# Patient Record
Sex: MALE | Race: WHITE | NOT HISPANIC OR LATINO | URBAN - METROPOLITAN AREA
[De-identification: names, ages, dates, MRNs, and addresses within clinical notes are randomized per-mention and may not be internally consistent; named-entity substitution may affect disease eponyms.]

---

## 2017-02-26 ENCOUNTER — EMERGENCY (EMERGENCY)
Facility: HOSPITAL | Age: 62
LOS: 0 days | Discharge: HOME | End: 2017-02-26
Attending: EMERGENCY MEDICINE | Admitting: EMERGENCY MEDICINE
Payer: COMMERCIAL

## 2017-02-26 VITALS
SYSTOLIC BLOOD PRESSURE: 152 MMHG | HEART RATE: 88 BPM | DIASTOLIC BLOOD PRESSURE: 78 MMHG | OXYGEN SATURATION: 95 % | TEMPERATURE: 98 F | WEIGHT: 250 LBS | RESPIRATION RATE: 18 BRPM | HEIGHT: 72 IN

## 2017-02-26 VITALS
SYSTOLIC BLOOD PRESSURE: 146 MMHG | RESPIRATION RATE: 18 BRPM | DIASTOLIC BLOOD PRESSURE: 83 MMHG | HEART RATE: 87 BPM | OXYGEN SATURATION: 100 % | TEMPERATURE: 99 F

## 2017-02-26 DIAGNOSIS — R14.0 ABDOMINAL DISTENSION (GASEOUS): ICD-10-CM

## 2017-02-26 DIAGNOSIS — Z98.890 OTHER SPECIFIED POSTPROCEDURAL STATES: Chronic | ICD-10-CM

## 2017-02-26 DIAGNOSIS — K56.7 ILEUS, UNSPECIFIED: ICD-10-CM

## 2017-02-26 DIAGNOSIS — Z88.5 ALLERGY STATUS TO NARCOTIC AGENT: ICD-10-CM

## 2017-02-26 DIAGNOSIS — I10 ESSENTIAL (PRIMARY) HYPERTENSION: ICD-10-CM

## 2017-02-26 LAB
ALBUMIN SERPL ELPH-MCNC: 2.7 G/DL — LOW (ref 3.3–5)
ALP SERPL-CCNC: 118 U/L — SIGNIFICANT CHANGE UP (ref 40–120)
ALT FLD-CCNC: 34 U/L — SIGNIFICANT CHANGE UP (ref 12–78)
ANION GAP SERPL CALC-SCNC: 10 MMOL/L — SIGNIFICANT CHANGE UP (ref 5–17)
APTT BLD: 35 SEC — SIGNIFICANT CHANGE UP (ref 27.5–37.4)
AST SERPL-CCNC: 38 U/L — HIGH (ref 15–37)
BASOPHILS # BLD AUTO: 0.1 K/UL — SIGNIFICANT CHANGE UP (ref 0–0.2)
BASOPHILS NFR BLD AUTO: 1.1 % — SIGNIFICANT CHANGE UP (ref 0–2)
BILIRUB SERPL-MCNC: 0.4 MG/DL — SIGNIFICANT CHANGE UP (ref 0.2–1.2)
BUN SERPL-MCNC: 7 MG/DL — SIGNIFICANT CHANGE UP (ref 7–23)
CALCIUM SERPL-MCNC: 8.1 MG/DL — LOW (ref 8.5–10.1)
CHLORIDE SERPL-SCNC: 107 MMOL/L — SIGNIFICANT CHANGE UP (ref 96–108)
CO2 SERPL-SCNC: 28 MMOL/L — SIGNIFICANT CHANGE UP (ref 22–31)
CREAT SERPL-MCNC: 0.59 MG/DL — SIGNIFICANT CHANGE UP (ref 0.5–1.3)
EOSINOPHIL # BLD AUTO: 0.5 K/UL — SIGNIFICANT CHANGE UP (ref 0–0.5)
EOSINOPHIL NFR BLD AUTO: 4.9 % — SIGNIFICANT CHANGE UP (ref 0–6)
GLUCOSE SERPL-MCNC: 90 MG/DL — SIGNIFICANT CHANGE UP (ref 70–99)
HCT VFR BLD CALC: 33.4 % — LOW (ref 39–50)
HGB BLD-MCNC: 11.4 G/DL — LOW (ref 13–17)
LACTATE SERPL-SCNC: 0.9 MMOL/L — SIGNIFICANT CHANGE UP (ref 0.7–2)
LIDOCAIN IGE QN: 212 U/L — SIGNIFICANT CHANGE UP (ref 73–393)
LYMPHOCYTES # BLD AUTO: 1.4 K/UL — SIGNIFICANT CHANGE UP (ref 1–3.3)
LYMPHOCYTES # BLD AUTO: 13.8 % — SIGNIFICANT CHANGE UP (ref 13–44)
MCHC RBC-ENTMCNC: 30.8 PG — SIGNIFICANT CHANGE UP (ref 27–34)
MCHC RBC-ENTMCNC: 34.2 GM/DL — SIGNIFICANT CHANGE UP (ref 32–36)
MCV RBC AUTO: 90.2 FL — SIGNIFICANT CHANGE UP (ref 80–100)
MONOCYTES # BLD AUTO: 1.1 K/UL — HIGH (ref 0–0.9)
MONOCYTES NFR BLD AUTO: 10.5 % — SIGNIFICANT CHANGE UP (ref 2–14)
NEUTROPHILS # BLD AUTO: 7.2 K/UL — SIGNIFICANT CHANGE UP (ref 1.8–7.4)
NEUTROPHILS NFR BLD AUTO: 69.6 % — SIGNIFICANT CHANGE UP (ref 43–77)
PLATELET # BLD AUTO: 486 K/UL — HIGH (ref 150–400)
POTASSIUM SERPL-MCNC: 3.6 MMOL/L — SIGNIFICANT CHANGE UP (ref 3.5–5.3)
POTASSIUM SERPL-SCNC: 3.6 MMOL/L — SIGNIFICANT CHANGE UP (ref 3.5–5.3)
PROT SERPL-MCNC: 6.5 GM/DL — SIGNIFICANT CHANGE UP (ref 6–8.3)
RBC # BLD: 3.7 M/UL — LOW (ref 4.2–5.8)
RBC # FLD: 12.8 % — SIGNIFICANT CHANGE UP (ref 10.3–14.5)
SODIUM SERPL-SCNC: 145 MMOL/L — SIGNIFICANT CHANGE UP (ref 135–145)
WBC # BLD: 10.3 K/UL — SIGNIFICANT CHANGE UP (ref 3.8–10.5)
WBC # FLD AUTO: 10.3 K/UL — SIGNIFICANT CHANGE UP (ref 3.8–10.5)

## 2017-02-26 PROCEDURE — 71010: CPT | Mod: 26

## 2017-02-26 PROCEDURE — 99285 EMERGENCY DEPT VISIT HI MDM: CPT

## 2017-02-26 PROCEDURE — 74177 CT ABD & PELVIS W/CONTRAST: CPT | Mod: 26

## 2017-02-26 RX ORDER — SODIUM CHLORIDE 9 MG/ML
3 INJECTION INTRAMUSCULAR; INTRAVENOUS; SUBCUTANEOUS ONCE
Qty: 0 | Refills: 0 | Status: COMPLETED | OUTPATIENT
Start: 2017-02-26 | End: 2017-02-26

## 2017-02-26 RX ORDER — ONDANSETRON 8 MG/1
4 TABLET, FILM COATED ORAL ONCE
Qty: 0 | Refills: 0 | Status: COMPLETED | OUTPATIENT
Start: 2017-02-26 | End: 2017-02-26

## 2017-02-26 RX ORDER — SODIUM CHLORIDE 9 MG/ML
1000 INJECTION INTRAMUSCULAR; INTRAVENOUS; SUBCUTANEOUS
Qty: 0 | Refills: 0 | Status: DISCONTINUED | OUTPATIENT
Start: 2017-02-26 | End: 2017-02-26

## 2017-02-26 RX ADMIN — SODIUM CHLORIDE 125 MILLILITER(S): 9 INJECTION INTRAMUSCULAR; INTRAVENOUS; SUBCUTANEOUS at 10:20

## 2017-02-26 RX ADMIN — ONDANSETRON 4 MILLIGRAM(S): 8 TABLET, FILM COATED ORAL at 10:24

## 2017-02-26 RX ADMIN — SODIUM CHLORIDE 3 MILLILITER(S): 9 INJECTION INTRAMUSCULAR; INTRAVENOUS; SUBCUTANEOUS at 10:09

## 2017-02-26 NOTE — ED PROVIDER NOTE - OBJECTIVE STATEMENT
60 y/o M with PMHx of HTN and PSHx of lumbar fusion (on 2/17 at [place]) was BIBA from [facility] for r/o SBO. Pt stated his abd has been distended but without pain. No other complaints. No N/V/D, fever, SB, CP, SOB, abd pain, chills, cough.

## 2017-02-26 NOTE — ED ADULT NURSE NOTE - OBJECTIVE STATEMENT
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 Pt has large amount of distention noted to the abdomen. pt is on a clear liquid diet and tolerating it well at [facility], pt denies abdominal pain, denies nausea or vomitting. pt states he has had diarrhea. pt is alert and orientedx4, VSS afebreile. All needs anticipated and met, safety maintained, will continue to monitor

## 2017-02-26 NOTE — ED PROVIDER NOTE - NS ED MD SCRIBE ATTENDING SCRIBE SECTIONS
PHYSICAL EXAM/PAST MEDICAL/SURGICAL/SOCIAL HISTORY/REVIEW OF SYSTEMS/DISPOSITION/HISTORY OF PRESENT ILLNESS

## 2017-02-26 NOTE — ED ADULT NURSE NOTE - CHPI ED SYMPTOMS NEG
no fever/no hematuria/no dysuria/no vomiting/no nausea/no chills/no blood in stool/no burning urination

## 2017-02-26 NOTE — ED PROVIDER NOTE - PROGRESS NOTE DETAILS
the pt expresess wishes to be d/c'd with [facility], he has been asymptomic since ed arrival, symotims of am diplopia warrants neuro/optho eval, which can be done as an o/p as he is asymtpic now and does not want ed evlaution the pt is tolrating cld, + flatus, last BM was yest evening, is not experiencing abd pain or discomfort, offered admison for ilues/bowel rest, he declined and wishes to cont with clear liquid diet and bowel rest at rehab facility, as he is not obstructed/obstipated/in pain and tolerating po, I wo;; d/c at his request

## 2017-02-26 NOTE — ED PROVIDER NOTE - PSYCHIATRIC, MLM
Abdomen soft, non-tender, no guarding. Alert and oriented to person, place, time/situation. normal mood and affect. no apparent risk to self or others.

## 2017-10-30 PROBLEM — Z00.00 ENCOUNTER FOR PREVENTIVE HEALTH EXAMINATION: Status: ACTIVE | Noted: 2017-10-30

## 2020-01-17 PROBLEM — I10 ESSENTIAL (PRIMARY) HYPERTENSION: Chronic | Status: ACTIVE | Noted: 2017-02-26

## 2020-01-21 ENCOUNTER — APPOINTMENT (OUTPATIENT)
Dept: NEUROLOGY | Facility: CLINIC | Age: 65
End: 2020-01-21
Payer: COMMERCIAL

## 2020-01-21 ENCOUNTER — APPOINTMENT (OUTPATIENT)
Dept: NEUROLOGY | Facility: CLINIC | Age: 65
End: 2020-01-21

## 2020-01-21 VITALS
DIASTOLIC BLOOD PRESSURE: 92 MMHG | HEIGHT: 72 IN | WEIGHT: 230 LBS | SYSTOLIC BLOOD PRESSURE: 152 MMHG | BODY MASS INDEX: 31.15 KG/M2 | HEART RATE: 78 BPM

## 2020-01-21 DIAGNOSIS — Z87.891 PERSONAL HISTORY OF NICOTINE DEPENDENCE: ICD-10-CM

## 2020-01-21 DIAGNOSIS — Z80.8 FAMILY HISTORY OF MALIGNANT NEOPLASM OF OTHER ORGANS OR SYSTEMS: ICD-10-CM

## 2020-01-21 DIAGNOSIS — M40.209 UNSPECIFIED KYPHOSIS, SITE UNSPECIFIED: ICD-10-CM

## 2020-01-21 DIAGNOSIS — Z72.89 OTHER PROBLEMS RELATED TO LIFESTYLE: ICD-10-CM

## 2020-01-21 DIAGNOSIS — F41.9 ANXIETY DISORDER, UNSPECIFIED: ICD-10-CM

## 2020-01-21 DIAGNOSIS — F32.9 ANXIETY DISORDER, UNSPECIFIED: ICD-10-CM

## 2020-01-21 DIAGNOSIS — Z86.39 PERSONAL HISTORY OF OTHER ENDOCRINE, NUTRITIONAL AND METABOLIC DISEASE: ICD-10-CM

## 2020-01-21 DIAGNOSIS — Z80.1 FAMILY HISTORY OF MALIGNANT NEOPLASM OF TRACHEA, BRONCHUS AND LUNG: ICD-10-CM

## 2020-01-21 PROCEDURE — 99204 OFFICE O/P NEW MOD 45 MIN: CPT

## 2020-01-21 RX ORDER — QUETIAPINE 400 MG/1
400 TABLET, FILM COATED ORAL
Refills: 0 | Status: ACTIVE | COMMUNITY

## 2020-01-21 RX ORDER — OXCARBAZEPINE 300 MG/1
300 TABLET, FILM COATED ORAL
Refills: 0 | Status: ACTIVE | COMMUNITY

## 2020-01-21 RX ORDER — GABAPENTIN 800 MG/1
800 TABLET, FILM COATED ORAL
Refills: 0 | Status: ACTIVE | COMMUNITY

## 2020-01-21 RX ORDER — GEMFIBROZIL 600 MG/1
600 TABLET, FILM COATED ORAL
Refills: 0 | Status: ACTIVE | COMMUNITY

## 2020-01-21 NOTE — REASON FOR VISIT
[Consultation] : a consultation visit [Spouse] : spouse [FreeTextEntry1] : For second opinion regarding MRI brain findings related to alcohol use

## 2020-01-21 NOTE — REVIEW OF SYSTEMS
[Anxiety] : anxiety [Depression] : depression [Memory Lapses or Loss] : memory loss [Decr. Concentrating Ability] : decreased concentrating ability [Poor Coordination] : poor coordination [Negative] : Heme/Lymph [de-identified] : OCD

## 2020-01-21 NOTE — CONSULT LETTER
[Dear  ___] : Dear  [unfilled], [Consult Letter:] : I had the pleasure of evaluating your patient, [unfilled]. [DrSkyler  ___] : Dr. WALLER

## 2020-01-21 NOTE — HISTORY OF PRESENT ILLNESS
[FreeTextEntry1] : 64-year-old right-handed male with history remarkable for anxiety/depression/OCD, also has history of hypercholesterolemia and lumbar DJD status post lumbar fusion. Patient reports that he has noted problems with short-term memory since past one year, he reports he is forgetful, but does not elaborate, he reorts he has not noted any decline in his work performance, however occasionally once he could not find his car, more recently he forgot he had met somebody in the morning. \par \par As per patient's wife, patients colleagues/partners at work noted him to have cognitive issues at work, they arranged consultation with neurologist Dr. Castellanos at [facility] in Nov, 2019, he had MRI of the brain that revealed volume loss more than expected for patient's age; this was thought to be secondary to patient's alcohol intake, he was hospitalized for 2 days for detoxification from alcohol, he did not have any withdrawal symptoms, he was discharged home and has since resumed his alcohol.\par \par Of note, patient reportedly drinks 2 bottles of wine per day for several decades, he has remained fully functional, a year ago, his wife reports he started drinking 2 bottles of wine per day, in addition since past 2 months he has been adding scotch whiskey to his wine. Patient has been following up with a psychiatrist Dr. Gandhi for decades, as per patient's wife he has called him functioning alcoholic, patient has been on Trileptal, BuSpar, Seroquel and Neurontin for mood disorder.\par \par The patient has not had any behavioral issues, he denies sleep disturbance, tremulors, motor weakness, he walks with a hunched posture, this is subacute/chronic, he has undergone lumbar fusion surgery apparently hunched posture improved remarkably after the surgery.

## 2020-01-21 NOTE — PHYSICAL EXAM
[___ / 3] : Attention (Serial 7 subtraction): [unfilled] / 3 [___ / 1] : Fluency: [unfilled] / 1 [___ / 2] : Abstraction: [unfilled] / 2 [___ / 6] : Orientation: [unfilled] / 6 [General Appearance - Alert] : alert [General Appearance - In No Acute Distress] : in no acute distress [General Appearance - Well-Appearing] : healthy appearing [Oriented To Time, Place, And Person] : oriented to person, place, and time [Mood] : the mood was normal [Person] : oriented to person [Place] : oriented to place [Time] : oriented to time [Concentration Intact] : normal concentrating ability [Visual Intact] : visual attention was ~T not ~L decreased [Naming Objects] : no difficulty naming common objects [Repeating Phrases] : no difficulty repeating a phrase [Writing A Sentence] : no difficulty writing a sentence [Fluency] : fluency intact [Comprehension] : comprehension intact [Reading] : reading intact [Past History] : adequate knowledge of personal past history [Cranial Nerves Optic (II)] : visual acuity intact bilaterally,  visual fields full to confrontation, pupils equal round and reactive to light [Cranial Nerves Trigeminal (V)] : facial sensation intact symmetrically [Cranial Nerves Oculomotor (III)] : extraocular motion intact [Cranial Nerves Facial (VII)] : face symmetrical [Cranial Nerves Vestibulocochlear (VIII)] : hearing was intact bilaterally [Cranial Nerves Accessory (XI - Cranial And Spinal)] : head turning and shoulder shrug symmetric [Cranial Nerves Glossopharyngeal (IX)] : tongue and palate midline [Cranial Nerves Hypoglossal (XII)] : there was no tongue deviation with protrusion [Motor Tone] : muscle tone was normal in all four extremities [Motor Strength] : muscle strength was normal in all four extremities [No Muscle Atrophy] : normal bulk in all four extremities [Sensation Tactile Decrease] : light touch was intact [Proprioception] : proprioception was intact [Vibration Decrease Leg / Foot Both Ankles] : decreased at both ankles [Vibration Decrease Leg / Foot Toes Both Feet] : decreased at the toes of both feet  [2+] : Brachioradialis left 2+ [1+] : Patella left 1+ [0] : Ankle jerk left 0 [PERRL With Normal Accommodation] : pupils were equal in size, round, reactive to light, with normal accommodation [Extraocular Movements] : extraocular movements were intact [No APD] : no afferent pupillary defect [Full Visual Field] : full visual field [Outer Ear] : the ears and nose were normal in appearance [Hearing Threshold Finger Rub Not Brooke] : hearing was normal [Neck Cervical Mass (___cm)] : no neck mass was observed [Auscultation Breath Sounds / Voice Sounds] : lungs were clear to auscultation bilaterally [Heart Rate And Rhythm] : heart rate was normal and rhythm regular [Heart Sounds] : normal S1 and S2 [Arterial Pulses Carotid] : carotid pulses were normal with no bruits [No Spinal Tenderness] : no spinal tenderness [Edema] : there was no peripheral edema [Involuntary Movements] : no involuntary movements were seen [] : no rash [Romberg's Sign] : Romberg's sign was negtive [Past-pointing] : there was no past-pointing [Tremor] : no tremor present [Dysdiadochokinesia Bilaterally] : not present [Coordination - Dysmetria Impaired Finger-to-Nose Bilateral] : not present [Plantar Reflex Right Only] : normal on the right [Plantar Reflex Left Only] : normal on the left [FreeTextEntry6] : No hypertonia or rigidity, no bradykinesia, finger tapping foot tapping equal [FreeTextEntry8] : Gait/balance: Hunched/stooped posture, no festination

## 2020-01-21 NOTE — DISCUSSION/SUMMARY
[FreeTextEntry1] : 64-year-old male with history remarkable for anxiety/depression/OCD, hypercholesterolemia and lumbar DJD s/p fusion; is a chronic functioning alcoholic, drinks 2 double bottles of wine per day, has been noted to have cognitive changes by his colleagues/partners at work; MRI brain revealed volume loss more than expected for patient's age.\par \par MoCA test - 25/30, deficits noted in recall, fluency and visuo-spatial function\par \par # Mild cognitive impairment; non-amnestic; could be related to ETOH use\par \par - Had a detailed discussion with the patient, it recommended abstaining from alcohol.\par - Have advised him to start taking thiamine 100 mg daily\par - We will perform cognitive assessment in 4-5 weeks\par - For full assessment of cognitive dysfunction versus mood related etiology neuropsychological testing with Dr. Damon PhD is recommended\par # Anxiety/depression/OCD; well controlled\par \par - F/U with a psychiatrist Dr. Gandhi; continue Trileptal, BuSpar, Seroquel, Neurontin; F/U Trileptal levels\par \par # Hunched posture, subacute/chronic, most likely related to spinal pathology, at present exam not c/w  Parkinson's disease

## 2020-01-21 NOTE — DATA REVIEWED
[de-identified] : 11/25/19: MRI brain:No acute intracranial abnormality. Parenchymal volume loss, more than expected for patient's age

## 2020-02-12 ENCOUNTER — APPOINTMENT (OUTPATIENT)
Dept: NEUROLOGY | Facility: CLINIC | Age: 65
End: 2020-02-12
Payer: COMMERCIAL

## 2020-02-12 VITALS
DIASTOLIC BLOOD PRESSURE: 96 MMHG | WEIGHT: 210 LBS | HEIGHT: 72 IN | BODY MASS INDEX: 28.44 KG/M2 | HEART RATE: 87 BPM | SYSTOLIC BLOOD PRESSURE: 180 MMHG

## 2020-02-12 DIAGNOSIS — G31.84 MILD COGNITIVE IMPAIRMENT, SO STATED: ICD-10-CM

## 2020-02-12 DIAGNOSIS — F10.20 ALCOHOL DEPENDENCE, UNCOMPLICATED: ICD-10-CM

## 2020-02-12 PROCEDURE — 99214 OFFICE O/P EST MOD 30 MIN: CPT

## 2020-02-12 NOTE — DATA REVIEWED
[de-identified] : 11/25/19: MRI brain:No acute intracranial abnormality. Parenchymal volume loss, more than expected for patient's age [de-identified] : Records from [facility] - admission 11/25/2019 through 11/27/19 reviewed.

## 2020-02-12 NOTE — HISTORY OF PRESENT ILLNESS
[FreeTextEntry1] : Patient is here for a followup visit today, was last seen on 1/21/20, patient is accompanied by his wife. As per patient's wife, he continues to have memory lapses, in addition, he has been getting edgy, agitated and difficult to control. Patient has tried abstaining from alcohol but relapsed within a few days, he is now contemplating going to [facility] for ETOH detoxification, at present, he continues to drink alcohol, in addition he is taking thiamine.\par \par Patient has followed up with his psychiatrist Dr. Gandhi, he has encouraged him to go for detoxification.\par \par Patient was advised to have neuropsychological testing, it has not been scheduled due to a planned trip to [facility], patient's wife is insisting on doing computer based cognitive assessment at the office (as baseline) before his departure to [facility].

## 2020-02-12 NOTE — PHYSICAL EXAM
[General Appearance - Well-Appearing] : healthy appearing [General Appearance - Alert] : alert [General Appearance - In No Acute Distress] : in no acute distress [Mood] : the mood was normal [Oriented To Time, Place, And Person] : oriented to person, place, and time [Person] : oriented to person [Time] : oriented to time [Place] : oriented to place [Visual Intact] : visual attention was ~T not ~L decreased [Naming Objects] : no difficulty naming common objects [Concentration Intact] : normal concentrating ability [Writing A Sentence] : no difficulty writing a sentence [Fluency] : fluency intact [Repeating Phrases] : no difficulty repeating a phrase [Reading] : reading intact [Comprehension] : comprehension intact [Past History] : adequate knowledge of personal past history [___ / 3] : Attention (Serial 7 subtraction): [unfilled] / 3 [___ / 1] : Fluency: [unfilled] / 1 [___ / 2] : Abstraction: [unfilled] / 2 [___ / 6] : Orientation: [unfilled] / 6 [Cranial Nerves Oculomotor (III)] : extraocular motion intact [Cranial Nerves Trigeminal (V)] : facial sensation intact symmetrically [Cranial Nerves Optic (II)] : visual acuity intact bilaterally,  visual fields full to confrontation, pupils equal round and reactive to light [Cranial Nerves Facial (VII)] : face symmetrical [Cranial Nerves Glossopharyngeal (IX)] : tongue and palate midline [Cranial Nerves Accessory (XI - Cranial And Spinal)] : head turning and shoulder shrug symmetric [Cranial Nerves Vestibulocochlear (VIII)] : hearing was intact bilaterally [Cranial Nerves Hypoglossal (XII)] : there was no tongue deviation with protrusion [Motor Strength] : muscle strength was normal in all four extremities [Motor Tone] : muscle tone was normal in all four extremities [Proprioception] : proprioception was intact [Sensation Tactile Decrease] : light touch was intact [No Muscle Atrophy] : normal bulk in all four extremities [Vibration Decrease Leg / Foot Both Ankles] : decreased at both ankles [Vibration Decrease Leg / Foot Toes Both Feet] : decreased at the toes of both feet  [2+] : Brachioradialis left 2+ [1+] : Patella left 1+ [0] : Ankle jerk left 0 [PERRL With Normal Accommodation] : pupils were equal in size, round, reactive to light, with normal accommodation [Extraocular Movements] : extraocular movements were intact [No APD] : no afferent pupillary defect [Outer Ear] : the ears and nose were normal in appearance [Full Visual Field] : full visual field [Hearing Threshold Finger Rub Not [place]] : hearing was normal [Neck Cervical Mass (___cm)] : no neck mass was observed [] : no respiratory distress [Auscultation Breath Sounds / Voice Sounds] : lungs were clear to auscultation bilaterally [Heart Rate And Rhythm] : heart rate was normal and rhythm regular [Edema] : there was no peripheral edema [Heart Sounds] : normal S1 and S2 [Arterial Pulses Carotid] : carotid pulses were normal with no bruits [Involuntary Movements] : no involuntary movements were seen [Romberg's Sign] : Romberg's sign was negtive [Past-pointing] : there was no past-pointing [Tremor] : no tremor present [Dysdiadochokinesia Bilaterally] : not present [Coordination - Dysmetria Impaired Finger-to-Nose Bilateral] : not present [Plantar Reflex Right Only] : normal on the right [Plantar Reflex Left Only] : normal on the left [FreeTextEntry8] : Gait/balance: Hunched/stooped posture, no festination [FreeTextEntry6] : No hypertonia or rigidity, no bradykinesia, finger tapping foot tapping equal

## 2020-02-12 NOTE — DISCUSSION/SUMMARY
[FreeTextEntry1] : 64-year-old male with history remarkable for anxiety/depression/OCD, hypercholesterolemia and lumbar DJD s/p fusion; is a chronic functioning alcoholic, drinks 2 double bottles of wine per day, has been noted to have cognitive changes by his colleagues/partners at work; MRI brain revealed volume loss more than expected for patient's age.\par \par MoCA test - 25/30, deficits noted in recall, fluency and visuo-spatial function\par \par # Mild cognitive impairment; non-amnestic; could be related to ETOH use\par \par # Depression / mood ds  / OCD \par \par - Have recommended abstaining from alcohol, agree with ETOH Detox plan\par - Continue thiamine 100 mg daily\par - Pt unable to have full neuropsychological testing at this time with Dr. Damon PhD, pts wishes to have computer based cognitive assessment as baseline before going for rehab\par - I have recommended expert opinion with Dr. Tyson Aaron who has expertise in neurobehavioral disorders\par - F/U with Dr. Gandhi for mood and new behavioral changes; continue Trileptal, BuSpar, Seroquel, Neurontin\par \par # Hunched posture, subacute/chronic, most likely related to spinal pathology, at present exam not c/w  Parkinson's disease

## 2020-02-12 NOTE — REVIEW OF SYSTEMS
[Depression] : depression [Anxiety] : anxiety [Poor Coordination] : poor coordination [Memory Lapses or Loss] : memory loss [Decr. Concentrating Ability] : decreased concentrating ability [Negative] : Heme/Lymph [de-identified] : OCD

## 2020-06-26 ENCOUNTER — EMERGENCY (EMERGENCY)
Facility: HOSPITAL | Age: 65
LOS: 0 days | Discharge: HOME | End: 2020-06-26
Attending: EMERGENCY MEDICINE
Payer: COMMERCIAL

## 2020-06-26 VITALS
TEMPERATURE: 98 F | RESPIRATION RATE: 19 BRPM | SYSTOLIC BLOOD PRESSURE: 127 MMHG | OXYGEN SATURATION: 99 % | HEART RATE: 73 BPM | DIASTOLIC BLOOD PRESSURE: 72 MMHG

## 2020-06-26 VITALS
DIASTOLIC BLOOD PRESSURE: 76 MMHG | SYSTOLIC BLOOD PRESSURE: 132 MMHG | HEIGHT: 71 IN | HEART RATE: 66 BPM | TEMPERATURE: 98 F | OXYGEN SATURATION: 98 % | RESPIRATION RATE: 20 BRPM | WEIGHT: 199.96 LBS

## 2020-06-26 DIAGNOSIS — Z98.84 BARIATRIC SURGERY STATUS: Chronic | ICD-10-CM

## 2020-06-26 DIAGNOSIS — Z88.5 ALLERGY STATUS TO NARCOTIC AGENT: ICD-10-CM

## 2020-06-26 DIAGNOSIS — R06.02 SHORTNESS OF BREATH: ICD-10-CM

## 2020-06-26 DIAGNOSIS — Z98.890 OTHER SPECIFIED POSTPROCEDURAL STATES: Chronic | ICD-10-CM

## 2020-06-26 DIAGNOSIS — F01.50 VASCULAR DEMENTIA WITHOUT BEHAVIORAL DISTURBANCE: ICD-10-CM

## 2020-06-26 DIAGNOSIS — Z87.891 PERSONAL HISTORY OF NICOTINE DEPENDENCE: ICD-10-CM

## 2020-06-26 DIAGNOSIS — I10 ESSENTIAL (PRIMARY) HYPERTENSION: ICD-10-CM

## 2020-06-26 LAB
ALBUMIN SERPL ELPH-MCNC: 3.6 G/DL — SIGNIFICANT CHANGE UP (ref 3.3–5)
ALP SERPL-CCNC: 73 U/L — SIGNIFICANT CHANGE UP (ref 40–120)
ALT FLD-CCNC: 19 U/L — SIGNIFICANT CHANGE UP (ref 12–78)
ANION GAP SERPL CALC-SCNC: 7 MMOL/L — SIGNIFICANT CHANGE UP (ref 5–17)
APTT BLD: 29.3 SEC — SIGNIFICANT CHANGE UP (ref 27.5–36.3)
AST SERPL-CCNC: 21 U/L — SIGNIFICANT CHANGE UP (ref 15–37)
BASOPHILS # BLD AUTO: 0.04 K/UL — SIGNIFICANT CHANGE UP (ref 0–0.2)
BASOPHILS NFR BLD AUTO: 0.5 % — SIGNIFICANT CHANGE UP (ref 0–2)
BILIRUB SERPL-MCNC: 0.9 MG/DL — SIGNIFICANT CHANGE UP (ref 0.2–1.2)
BUN SERPL-MCNC: 7 MG/DL — SIGNIFICANT CHANGE UP (ref 7–23)
CALCIUM SERPL-MCNC: 8.9 MG/DL — SIGNIFICANT CHANGE UP (ref 8.5–10.1)
CHLORIDE SERPL-SCNC: 109 MMOL/L — HIGH (ref 96–108)
CO2 SERPL-SCNC: 24 MMOL/L — SIGNIFICANT CHANGE UP (ref 22–31)
CREAT SERPL-MCNC: 0.78 MG/DL — SIGNIFICANT CHANGE UP (ref 0.5–1.3)
EOSINOPHIL # BLD AUTO: 0.09 K/UL — SIGNIFICANT CHANGE UP (ref 0–0.5)
EOSINOPHIL NFR BLD AUTO: 1.1 % — SIGNIFICANT CHANGE UP (ref 0–6)
GLUCOSE SERPL-MCNC: 111 MG/DL — HIGH (ref 70–99)
HCT VFR BLD CALC: 44.3 % — SIGNIFICANT CHANGE UP (ref 39–50)
HGB BLD-MCNC: 15.4 G/DL — SIGNIFICANT CHANGE UP (ref 13–17)
IMM GRANULOCYTES NFR BLD AUTO: 0.3 % — SIGNIFICANT CHANGE UP (ref 0–1.5)
INR BLD: 1.07 RATIO — SIGNIFICANT CHANGE UP (ref 0.88–1.16)
LYMPHOCYTES # BLD AUTO: 0.89 K/UL — LOW (ref 1–3.3)
LYMPHOCYTES # BLD AUTO: 11.4 % — LOW (ref 13–44)
MCHC RBC-ENTMCNC: 29.3 PG — SIGNIFICANT CHANGE UP (ref 27–34)
MCHC RBC-ENTMCNC: 34.8 GM/DL — SIGNIFICANT CHANGE UP (ref 32–36)
MCV RBC AUTO: 84.2 FL — SIGNIFICANT CHANGE UP (ref 80–100)
MONOCYTES # BLD AUTO: 0.73 K/UL — SIGNIFICANT CHANGE UP (ref 0–0.9)
MONOCYTES NFR BLD AUTO: 9.3 % — SIGNIFICANT CHANGE UP (ref 2–14)
NEUTROPHILS # BLD AUTO: 6.06 K/UL — SIGNIFICANT CHANGE UP (ref 1.8–7.4)
NEUTROPHILS NFR BLD AUTO: 77.4 % — HIGH (ref 43–77)
NRBC # BLD: 0 /100 WBCS — SIGNIFICANT CHANGE UP (ref 0–0)
PLATELET # BLD AUTO: 208 K/UL — SIGNIFICANT CHANGE UP (ref 150–400)
POTASSIUM SERPL-MCNC: 3.9 MMOL/L — SIGNIFICANT CHANGE UP (ref 3.5–5.3)
POTASSIUM SERPL-SCNC: 3.9 MMOL/L — SIGNIFICANT CHANGE UP (ref 3.5–5.3)
PROT SERPL-MCNC: 7.2 GM/DL — SIGNIFICANT CHANGE UP (ref 6–8.3)
PROTHROM AB SERPL-ACNC: 11.9 SEC — SIGNIFICANT CHANGE UP (ref 10–12.9)
RBC # BLD: 5.26 M/UL — SIGNIFICANT CHANGE UP (ref 4.2–5.8)
RBC # FLD: 13.8 % — SIGNIFICANT CHANGE UP (ref 10.3–14.5)
SARS-COV-2 RNA SPEC QL NAA+PROBE: SIGNIFICANT CHANGE UP
SODIUM SERPL-SCNC: 140 MMOL/L — SIGNIFICANT CHANGE UP (ref 135–145)
TROPONIN I SERPL-MCNC: <.015 NG/ML — SIGNIFICANT CHANGE UP (ref 0.01–0.04)
WBC # BLD: 7.83 K/UL — SIGNIFICANT CHANGE UP (ref 3.8–10.5)
WBC # FLD AUTO: 7.83 K/UL — SIGNIFICANT CHANGE UP (ref 3.8–10.5)

## 2020-06-26 PROCEDURE — 93010 ELECTROCARDIOGRAM REPORT: CPT

## 2020-06-26 PROCEDURE — 99285 EMERGENCY DEPT VISIT HI MDM: CPT

## 2020-06-26 PROCEDURE — 71045 X-RAY EXAM CHEST 1 VIEW: CPT | Mod: 26

## 2020-06-26 NOTE — ED PROVIDER NOTE - CLINICAL SUMMARY MEDICAL DECISION MAKING FREE TEXT BOX
Patient with SOB no other associated symptoms. Comfortable appearing. Will check labs, CXR, test for COVID and reassess.

## 2020-06-26 NOTE — ED PROVIDER NOTE - PATIENT PORTAL LINK FT
You can access the FollowMyHealth Patient Portal offered by [facility] by registering at the following website: [email or web address]. By joining [employer]’s FollowMyHealth portal, you will also be able to view your health information using other applications (apps) compatible with our system.

## 2020-06-26 NOTE — ED ADULT NURSE NOTE - NSIMPLEMENTINTERV_GEN_ALL_ED
Implemented All Universal Safety Interventions:  [place] to call system. Call bell, personal items and telephone within reach. Instruct patient to call for assistance. Room bathroom lighting operational. Non-slip footwear when patient is off stretcher. Physically safe environment: no spills, clutter or unnecessary equipment. Stretcher in lowest position, wheels locked, appropriate side rails in place.

## 2020-06-26 NOTE — ED PROVIDER NOTE - MUSCULOSKELETAL, MLM
Spine appears normal, range of motion is not limited, no muscle or joint tenderness. No edema. No calf tenderness.

## 2020-06-26 NOTE — ED PROVIDER NOTE - OBJECTIVE STATEMENT
65 y/o M with a PMHx of HTN presents to the ED c/o SOB since today. Patient is still feeling SOB. Patient states he was a former smoker 20 years ago. Denies fever, chills, tiredness, chest pain, nausea, vomiting, sick contact or any other related symptoms.     Contact #: [phone] 65 y/o M with a PMHx of HTN presents to the ED c/o SOB since today. Patient is still feeling SOB. Patient states he was a former smoker 20 years ago. Per Patient's wife, Patient has vascular dementia from EtOH abuse and was released from rehab on March 25. Patient is managed on Antabuse and when he takes it wife notices his SOB starts. Denies fever, chills, tiredness, chest pain, nausea, vomiting, sick contact or any other related symptoms.     Contact #: [phone]

## 2020-06-26 NOTE — ED PROVIDER NOTE - PROGRESS NOTE DETAILS
Pt is well appearing, in no distress, labs, cxr, ekg noted, will dc to follow up with pmd. Precautions reviewed.

## 2020-06-26 NOTE — ED ADULT TRIAGE NOTE - CHIEF COMPLAINT QUOTE
pt brought in by spouse, pt has vascular dementia from etoh abuse, today wife admits he has been c/o sob, pt has several scratches and abrasions to the bilateral arms and legs , as per wife he picks himself, pt admits she gave him citalopram prior to coming to Ed as he gets frequent panic attacks .

## 2020-07-14 NOTE — ED ADULT NURSE NOTE - MUSCULOSKELETAL WDL
Full range of motion of upper and lower extremities, no joint tenderness/swelling.

## 2020-08-30 ENCOUNTER — EMERGENCY (EMERGENCY)
Facility: HOSPITAL | Age: 65
LOS: 1 days | Discharge: HOME | End: 2020-08-30
Attending: EMERGENCY MEDICINE
Payer: COMMERCIAL

## 2020-08-30 VITALS
TEMPERATURE: 99 F | WEIGHT: 214.95 LBS | RESPIRATION RATE: 16 BRPM | OXYGEN SATURATION: 98 % | DIASTOLIC BLOOD PRESSURE: 80 MMHG | SYSTOLIC BLOOD PRESSURE: 153 MMHG | HEIGHT: 72 IN | HEART RATE: 105 BPM

## 2020-08-30 DIAGNOSIS — Z98.84 BARIATRIC SURGERY STATUS: Chronic | ICD-10-CM

## 2020-08-30 DIAGNOSIS — Z98.890 OTHER SPECIFIED POSTPROCEDURAL STATES: Chronic | ICD-10-CM

## 2020-08-30 PROCEDURE — 99284 EMERGENCY DEPT VISIT MOD MDM: CPT

## 2020-08-30 PROCEDURE — 73140 X-RAY EXAM OF FINGER(S): CPT | Mod: 26,RT

## 2020-08-30 RX ORDER — ONDANSETRON 8 MG/1
4 TABLET, FILM COATED ORAL ONCE
Refills: 0 | Status: COMPLETED | OUTPATIENT
Start: 2020-08-30 | End: 2020-08-30

## 2020-08-30 RX ORDER — ACETAMINOPHEN 500 MG
975 TABLET ORAL ONCE
Refills: 0 | Status: COMPLETED | OUTPATIENT
Start: 2020-08-30 | End: 2020-08-30

## 2020-08-30 RX ORDER — CEFAZOLIN SODIUM 1 G
1000 VIAL (EA) INJECTION ONCE
Refills: 0 | Status: COMPLETED | OUTPATIENT
Start: 2020-08-30 | End: 2020-08-30

## 2020-08-30 RX ORDER — TETANUS TOXOID, REDUCED DIPHTHERIA TOXOID AND ACELLULAR PERTUSSIS VACCINE, ADSORBED 5; 2.5; 8; 8; 2.5 [IU]/.5ML; [IU]/.5ML; UG/.5ML; UG/.5ML; UG/.5ML
0.5 SUSPENSION INTRAMUSCULAR ONCE
Refills: 0 | Status: COMPLETED | OUTPATIENT
Start: 2020-08-30 | End: 2020-08-30

## 2020-08-30 RX ORDER — MORPHINE SULFATE 50 MG/1
4 CAPSULE, EXTENDED RELEASE ORAL ONCE
Refills: 0 | Status: DISCONTINUED | OUTPATIENT
Start: 2020-08-30 | End: 2020-08-30

## 2020-08-30 RX ADMIN — ONDANSETRON 4 MILLIGRAM(S): 8 TABLET, FILM COATED ORAL at 23:30

## 2020-08-30 RX ADMIN — TETANUS TOXOID, REDUCED DIPHTHERIA TOXOID AND ACELLULAR PERTUSSIS VACCINE, ADSORBED 0.5 MILLILITER(S): 5; 2.5; 8; 8; 2.5 SUSPENSION INTRAMUSCULAR at 23:23

## 2020-08-30 RX ADMIN — MORPHINE SULFATE 4 MILLIGRAM(S): 50 CAPSULE, EXTENDED RELEASE ORAL at 23:30

## 2020-08-30 RX ADMIN — Medication 975 MILLIGRAM(S): at 23:23

## 2020-08-30 NOTE — ED PROVIDER NOTE - OBJECTIVE STATEMENT
65 y.o. male hx of HTN, wernicke encephalopathy in past, was sober for months but started drinking 1 week ago (6pack of beers/day; last drink 3pm), here for right 3rd finger injury. Wife accidentally closed wooden door on finger. Does not have amputated piece with him. Hemostasis achieved by EMS. Endorses intact sensation, no change in motor function.

## 2020-08-30 NOTE — ED PROVIDER NOTE - PROGRESS NOTE DETAILS
Abdelrahman Torres D.O., PGY2 (Resident)  Hand consulted. At bedside. Abdelrahman Torres D.O., PGY2 (Resident)  Patient feels improved. Results endorsed. Return precautions given. Patient expressed verbal understanding. Patient ambulatory, tolerating PO, AAOx3  Will DC with outpatient follow-up.

## 2020-08-30 NOTE — ED PROVIDER NOTE - CLINICAL SUMMARY MEDICAL DECISION MAKING FREE TEXT BOX
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 Pt has had prior anesthetic.     No history of anesthetic complications          ROS/MED HX    ENT/Pulmonary: Comment: Dysphonia  Vocal cord mass     (-) sleep apnea   Neurologic:  - neg neurologic ROS     Cardiovascular:     (+) Dyslipidemia, hypertension----. : . . . :. . Previous cardiac testing date:results:date: results:ECG reviewed date:2020 results:NSR date: results:          METS/Exercise Tolerance:  >4 METS   Hematologic:  - neg hematologic  ROS       Musculoskeletal:         GI/Hepatic:  - neg GI/hepatic ROS      (-) GERD   Renal/Genitourinary:  - ROS Renal section negative       Endo:  - neg endo ROS       Psychiatric:         Infectious Disease:  - neg infectious disease ROS       Malignancy:         Other:                          Physical Exam  Normal systems: dental    Airway   Mallampati: II  TM distance: >3 FB  Neck ROM: full    Dental     Cardiovascular   Rhythm and rate: irregular      Pulmonary    breath sounds clear to auscultation            Lab Results   Component Value Date    WBC 4.2 2020    HGB 13.3 2020    HCT 40.7 2020     2020     2020    POTASSIUM 3.9 2020    CHLORIDE 108 2020    CO2 26 2020    BUN 9 2020    CR 0.82 2020     (H) 2020    LOKESH 8.8 2020    ALBUMIN 3.5 2020    PROTTOTAL 7.0 2020    ALT 29 2020    AST 22 2020     "ALKPHOS 89 01/20/2020    BILITOTAL 0.3 01/20/2020    TSH 1.10 09/23/2014       Preop Vitals  BP Readings from Last 3 Encounters:   01/20/20 132/84   01/12/16 130/80   02/03/15 (!) 168/98    Pulse Readings from Last 3 Encounters:   01/20/20 95   02/03/15 111   01/13/15 92      Resp Readings from Last 3 Encounters:   12/19/11 16   04/27/09 18   02/25/09 20    SpO2 Readings from Last 3 Encounters:   01/20/20 97%   02/03/15 96%   05/04/11 96%      Temp Readings from Last 1 Encounters:   01/20/20 36.6  C (97.9  F) (Oral)    Ht Readings from Last 1 Encounters:   01/20/20 1.6 m (5' 3\")      Wt Readings from Last 1 Encounters:   01/20/20 60.8 kg (134 lb)    Estimated body mass index is 23.74 kg/m  as calculated from the following:    Height as of 1/20/20: 1.6 m (5' 3\").    Weight as of 1/20/20: 60.8 kg (134 lb).       Anesthesia Plan      History & Physical Review  History and physical reviewed and following examination; no interval change.    ASA Status:  2 .    NPO Status:  > 8 hours    Plan for General and ETT (Mask ventilation) with Intravenous and Propofol induction. Maintenance will be TIVA.    PONV prophylaxis:  Ondansetron (or other 5HT-3)  Additional equipment: Videolaryngoscope Propofol and remifentanil infusions  6.0 ETT      Postoperative Care      Consents  Anesthetic plan, risks, benefits and alternatives discussed with:  Patient..                 Juan Francisco Winchester MD  " 65 y.o. male here s/p right 3rd digit distal phalanx tuft avulsion w/o exposed bone, neurovasc compromise. Vitals wnl. Exam as in physical exam. Will consult hand, treat pain, xray, update tetanus.

## 2020-08-30 NOTE — ED PROVIDER NOTE - CARE PLAN
Principal Discharge DX:	Traumatic amputation of finger, initial encounter  Goal:	3rd digit, right hand, distal phalanx (not whole finger) Principal Discharge DX:	Avulsion, finger tip, initial encounter  Goal:	3rd digit, right hand, distal phalanx (not whole finger)  Secondary Diagnosis:	Open displaced fracture of distal phalanx of left middle finger, initial encounter

## 2020-08-30 NOTE — ED PROVIDER NOTE - NSFOLLOWUPINSTRUCTIONS_ED_ALL_ED_FT
YOU WERE SEEN IN THE ED FOR: finger injury    YOUR WOUND WAS SUTURED. YOU WERE GIVEN ANTIBIOTICS. PLEASE FOLLOW UP WITH DR. HECK IN 1 WEEK. INFORMATION TO CALL AND MAKE AN APPOINTMENT IS PROVIDED.    YOU WERE PRESCRIBED: keflex (cephalexin)  FOLLOW THE INSTRUCTIONS ON THE LABEL/CONTAINER    FOR PAIN/FEVER, YOU MAY TAKE TYLENOL (acetaminophen) AND/OR IBUPROFEN (advil or motrin). FOLLOW THE INSTRUCTIONS ON THE LABEL/CONTAINER.    PLEASE FOLLOW UP WITH YOUR PRIVATE PHYSICIAN WITHIN THE NEXT 72 HOURS. BRING COPIES OF YOUR RESULTS.    RETURN TO THE EMERGENCY DEPARTMENT IF YOU EXPERIENCE ANY NEW/CONCERNING/WORSENING SYMPTOMS.

## 2020-08-30 NOTE — ED ADULT NURSE NOTE - OBJECTIVE STATEMENT
65y old male PMH ETOH abuse, Wernicke-Korsakoff BIB EMS for finger injury. A&Ox3. Patients wife states patient was dx in November w/ Wernicke-Korsakoff, recently relapsed drinking alcohol this weekend, drank 6 beers today as per wife and patient, last drink was at 3pm, was bought into ED today d/t finger injury, 4th right digit, wife accidentally slammed finger into wooden door. Patient denies chest pain, sob, ha, n/v/d, abdominal pain, f/c, urinary symptoms, hematuria. Patient is well appearing, able to speak in full sentences, sensory intact in 4th right digit, guaze is soaked w/ blood, bleeding being controlled w/ dry guaze and compression, avulsion of finger tip present. Patient placed by nurse station for safety.

## 2020-08-30 NOTE — ED PROVIDER NOTE - NS ED ROS FT
CONSTITUTIONAL: No fevers, chills, fatigue, dizziness, weakness  HEENT: No loss of vision, double vision, blurry vision, nasal congestion, runny nose, sore throat  CV: No chest pain, palpitations  PULM: No cough, shortness of breath  GI: NAUSEA. No abdominal pain, vomiting  : No dysuria, polyuria, hematuria  SKIN: No rashes, swelling  MSK: RIGHT 3RD FINGER INJURY.  NEURO: no headache, paresthesias, tremor

## 2020-08-30 NOTE — ED PROVIDER NOTE - CARE PROVIDER_API CALL
Abeba Vale  ORTHOPAEDIC SURGERY  [address], [address]  [place] [address]  Phone: [phone]  Fax: [phone]  Follow Up Time: 4-6 Days

## 2020-08-30 NOTE — ED PROVIDER NOTE - PHYSICAL EXAMINATION
GENERAL: elderly female, lying in bed, NAD. Vital signs are within normal limits  HEENT: NC/AT, conjunctiva noninjected, sclera anicteric, moist mucous membranes,  NECK: Supple, trachea midline  LUNG: Nonlabored respirations, no wheezes  CV: RRR, no murmurs, Pulses- Radial: 2+ b/l  ABDOMEN: Nondistended  MSK: No visible deformities  NEURO: AAOx4 (to person, place, time, event), no tremor, steady gait  PSYCH: Normal mood and affect GENERAL: elderly male, lying in bed, NAD. Vital signs are within normal limits  HEENT: NC/AT, conjunctiva noninjected, sclera anicteric, moist mucous membranes,  NECK: Supple, trachea midline  LUNG: Nonlabored respirations, no wheezes  CV: RRR, no murmurs, Pulses- Radial: 2+ bilateral, cap refil 2seconds  ABDOMEN: Nondistended, nontender  MSK: RIGHT 3RD DIGIT AMPUTATION OF TUFT W/O OBVIOUS EXPOSED BONE, TENDINOUS, LIGAMENTOUS, NERVE, VASCULAR INJURY. hemostasis achieved. Full ROM of all fingers and hand bilateral.   NEURO: AAOx4 (to person, place, time, event), no tremor, steady gait  PSYCH: irritable

## 2020-08-30 NOTE — ED ADULT NURSE NOTE - CHPI ED NUR SYMPTOMS NEG
no chills/no dizziness/no decreased eating/drinking/no tingling/no vomiting/no weakness/no fever/no nausea

## 2020-08-30 NOTE — ED PROVIDER NOTE - ATTENDING CONTRIBUTION TO CARE
avulsion to L 3rd distal phalanx. NV intact. on xray there is a distal tuft fx. hand surgery consulted - pt had wound approximated via flap by hand surgery. he received IV abx for open fx. hand cleared pt to go home with oral abx and outpt f/u.

## 2020-08-30 NOTE — ED PROVIDER NOTE - PATIENT PORTAL LINK FT
You can access the FollowMyHealth Patient Portal offered by [facility] by registering at the following website: [email or web address]. By joining [employer]’s FollowMyHealth portal, you will also be able to view your health information using other applications (apps) compatible with our system.

## 2020-08-31 VITALS
OXYGEN SATURATION: 98 % | DIASTOLIC BLOOD PRESSURE: 97 MMHG | TEMPERATURE: 99 F | HEART RATE: 96 BPM | SYSTOLIC BLOOD PRESSURE: 161 MMHG | RESPIRATION RATE: 17 BRPM

## 2020-08-31 PROBLEM — F01.50 VASCULAR DEMENTIA WITHOUT BEHAVIORAL DISTURBANCE: Chronic | Status: ACTIVE | Noted: 2020-06-26

## 2020-08-31 PROBLEM — F10.10 ALCOHOL ABUSE, UNCOMPLICATED: Chronic | Status: ACTIVE | Noted: 2020-06-26

## 2020-08-31 LAB
ALBUMIN SERPL ELPH-MCNC: 4.3 G/DL — SIGNIFICANT CHANGE UP (ref 3.3–5)
ALP SERPL-CCNC: 62 U/L — SIGNIFICANT CHANGE UP (ref 40–120)
ALT FLD-CCNC: 9 U/L — LOW (ref 10–45)
ANION GAP SERPL CALC-SCNC: 20 MMOL/L — HIGH (ref 5–17)
APTT BLD: 30.1 SEC — SIGNIFICANT CHANGE UP (ref 27.5–35.5)
AST SERPL-CCNC: 20 U/L — SIGNIFICANT CHANGE UP (ref 10–40)
BASOPHILS # BLD AUTO: 0.04 K/UL — SIGNIFICANT CHANGE UP (ref 0–0.2)
BASOPHILS NFR BLD AUTO: 0.3 % — SIGNIFICANT CHANGE UP (ref 0–2)
BILIRUB SERPL-MCNC: 0.6 MG/DL — SIGNIFICANT CHANGE UP (ref 0.2–1.2)
BLD GP AB SCN SERPL QL: NEGATIVE — SIGNIFICANT CHANGE UP
BUN SERPL-MCNC: 10 MG/DL — SIGNIFICANT CHANGE UP (ref 7–23)
CALCIUM SERPL-MCNC: 9.6 MG/DL — SIGNIFICANT CHANGE UP (ref 8.4–10.5)
CHLORIDE SERPL-SCNC: 101 MMOL/L — SIGNIFICANT CHANGE UP (ref 96–108)
CO2 SERPL-SCNC: 16 MMOL/L — LOW (ref 22–31)
CREAT SERPL-MCNC: 0.95 MG/DL — SIGNIFICANT CHANGE UP (ref 0.5–1.3)
EOSINOPHIL # BLD AUTO: 0.04 K/UL — SIGNIFICANT CHANGE UP (ref 0–0.5)
EOSINOPHIL NFR BLD AUTO: 0.3 % — SIGNIFICANT CHANGE UP (ref 0–6)
GLUCOSE SERPL-MCNC: 103 MG/DL — HIGH (ref 70–99)
HCT VFR BLD CALC: 42.8 % — SIGNIFICANT CHANGE UP (ref 39–50)
HGB BLD-MCNC: 14.7 G/DL — SIGNIFICANT CHANGE UP (ref 13–17)
IMM GRANULOCYTES NFR BLD AUTO: 0.5 % — SIGNIFICANT CHANGE UP (ref 0–1.5)
INR BLD: 1.03 RATIO — SIGNIFICANT CHANGE UP (ref 0.88–1.16)
LYMPHOCYTES # BLD AUTO: 1.37 K/UL — SIGNIFICANT CHANGE UP (ref 1–3.3)
LYMPHOCYTES # BLD AUTO: 10.4 % — LOW (ref 13–44)
MCHC RBC-ENTMCNC: 29 PG — SIGNIFICANT CHANGE UP (ref 27–34)
MCHC RBC-ENTMCNC: 34.3 GM/DL — SIGNIFICANT CHANGE UP (ref 32–36)
MCV RBC AUTO: 84.4 FL — SIGNIFICANT CHANGE UP (ref 80–100)
MONOCYTES # BLD AUTO: 1.19 K/UL — HIGH (ref 0–0.9)
MONOCYTES NFR BLD AUTO: 9.1 % — SIGNIFICANT CHANGE UP (ref 2–14)
NEUTROPHILS # BLD AUTO: 10.42 K/UL — HIGH (ref 1.8–7.4)
NEUTROPHILS NFR BLD AUTO: 79.4 % — HIGH (ref 43–77)
NRBC # BLD: 0 /100 WBCS — SIGNIFICANT CHANGE UP (ref 0–0)
PLATELET # BLD AUTO: 249 K/UL — SIGNIFICANT CHANGE UP (ref 150–400)
POTASSIUM SERPL-MCNC: 3.7 MMOL/L — SIGNIFICANT CHANGE UP (ref 3.5–5.3)
POTASSIUM SERPL-SCNC: 3.7 MMOL/L — SIGNIFICANT CHANGE UP (ref 3.5–5.3)
PROT SERPL-MCNC: 7 G/DL — SIGNIFICANT CHANGE UP (ref 6–8.3)
PROTHROM AB SERPL-ACNC: 12.2 SEC — SIGNIFICANT CHANGE UP (ref 10.6–13.6)
RBC # BLD: 5.07 M/UL — SIGNIFICANT CHANGE UP (ref 4.2–5.8)
RBC # FLD: 13.8 % — SIGNIFICANT CHANGE UP (ref 10.3–14.5)
RH IG SCN BLD-IMP: POSITIVE — SIGNIFICANT CHANGE UP
SODIUM SERPL-SCNC: 137 MMOL/L — SIGNIFICANT CHANGE UP (ref 135–145)
WBC # BLD: 13.13 K/UL — HIGH (ref 3.8–10.5)
WBC # FLD AUTO: 13.13 K/UL — HIGH (ref 3.8–10.5)

## 2020-08-31 PROCEDURE — 85610 PROTHROMBIN TIME: CPT

## 2020-08-31 PROCEDURE — 90715 TDAP VACCINE 7 YRS/> IM: CPT

## 2020-08-31 PROCEDURE — 85027 COMPLETE CBC AUTOMATED: CPT

## 2020-08-31 PROCEDURE — 26750 TREAT FINGER FRACTURE EACH: CPT | Mod: F7

## 2020-08-31 PROCEDURE — 12001 RPR S/N/AX/GEN/TRNK 2.5CM/<: CPT | Mod: F7,XU

## 2020-08-31 PROCEDURE — 85730 THROMBOPLASTIN TIME PARTIAL: CPT

## 2020-08-31 PROCEDURE — 96374 THER/PROPH/DIAG INJ IV PUSH: CPT | Mod: XU

## 2020-08-31 PROCEDURE — 90471 IMMUNIZATION ADMIN: CPT

## 2020-08-31 PROCEDURE — 86900 BLOOD TYPING SEROLOGIC ABO: CPT

## 2020-08-31 PROCEDURE — 11730 AVULSION NAIL PLATE SIMPLE 1: CPT | Mod: F7

## 2020-08-31 PROCEDURE — 86901 BLOOD TYPING SEROLOGIC RH(D): CPT

## 2020-08-31 PROCEDURE — 73140 X-RAY EXAM OF FINGER(S): CPT

## 2020-08-31 PROCEDURE — 80053 COMPREHEN METABOLIC PANEL: CPT

## 2020-08-31 PROCEDURE — 86850 RBC ANTIBODY SCREEN: CPT

## 2020-08-31 PROCEDURE — 96375 TX/PRO/DX INJ NEW DRUG ADDON: CPT | Mod: XU

## 2020-08-31 PROCEDURE — 99284 EMERGENCY DEPT VISIT MOD MDM: CPT | Mod: 25

## 2020-08-31 RX ORDER — CEPHALEXIN 500 MG
1 CAPSULE ORAL
Qty: 21 | Refills: 0
Start: 2020-08-31 | End: 2020-09-06

## 2020-08-31 RX ADMIN — MORPHINE SULFATE 4 MILLIGRAM(S): 50 CAPSULE, EXTENDED RELEASE ORAL at 00:00

## 2020-08-31 RX ADMIN — Medication 100 MILLIGRAM(S): at 00:12

## 2020-08-31 NOTE — CONSULT NOTE ADULT - SUBJECTIVE AND OBJECTIVE BOX
65 R hand dominant got his finger stuck in a door and injured his right 3rd digit and noted that the tip of his finger came off. Patient was noted to be sober from alcohol prior to admission but had relapsed per ER. Patient endorses some decreased sensation in his right finger. He denies any other orthopedic injuries. He denies numbness or tingling elsewhere.     PAST MEDICAL & SURGICAL HISTORY:        Xray R hand: acute 3rd distal phalanx fracture involving distal part with bone missing    Exam:  Gen: NAD  R hand  3rd finger acute injury to distal phalanx with nail attached  exposed 3rd digit distal phalanx with bleeding, bone was probed   TTP over distal phalanx  sensation intact dorsal/radial side of digit  full flexion and extension of 2nd digit  +Med/rad/uln/ain/pin    Secondary Survey: No TTP over bony prominences, SILT, palpable pulses, full/painless range of motion, compartments soft     Procedure: The 3rd finger was numbed with a local digital block and the 3rd nail was removed. The would was explored and copiusly irrigated with 2 L of saline. The distal phalanx was noted. The nail bed was intact with the distal most piece missing. The areas of injury of the distal finger tip with approximated using 5.0 nylon sutures. The finger tip wound was dressed with a semi occulusive dressing.     65 M with with R 3rd finger acute avulsion of finger tip, with open fracture     - NWB RUE   - Semi occlusive dressing applied to second finger tip with tegaderms and gauze with coban wrap. patient instructed on overall goal of semi occlusive dressing and instructions regarding wearing dressing at all times. patient understands and agrees to keep dressing  - No acute orthopedic surgical intervention at this time  - Analgesia as needed  - IV ancef in ER  - DC with keflex for 10 days  - patient to follow up with Dr. Vale in one week  - discussed with Dr. Vale who agrees with above

## 2020-09-01 ENCOUNTER — APPOINTMENT (OUTPATIENT)
Dept: ORTHOPEDIC SURGERY | Facility: CLINIC | Age: 65
End: 2020-09-01
Payer: COMMERCIAL

## 2020-09-01 VITALS
SYSTOLIC BLOOD PRESSURE: 172 MMHG | WEIGHT: 230 LBS | DIASTOLIC BLOOD PRESSURE: 89 MMHG | HEIGHT: 72 IN | BODY MASS INDEX: 31.15 KG/M2 | HEART RATE: 70 BPM

## 2020-09-01 PROCEDURE — 99203 OFFICE O/P NEW LOW 30 MIN: CPT

## 2020-09-08 ENCOUNTER — APPOINTMENT (OUTPATIENT)
Dept: ORTHOPEDIC SURGERY | Facility: CLINIC | Age: 65
End: 2020-09-08
Payer: COMMERCIAL

## 2020-09-08 PROCEDURE — 99214 OFFICE O/P EST MOD 30 MIN: CPT

## 2020-09-11 NOTE — ED ADULT NURSE NOTE - CAS EDN DISCHARGE ASSESSMENT
----- Message from Perfecto Stephen PA-C sent at 9/11/2020  8:08 AM CDT -----  Please let the patient know that the x-rays look good.  Still concerned about disc issues in the neck causing a pinched nerve and radiating the pain to her shoulder.  Recommend trying prednisone, 20 mg tablets -2 tablets daily for 5 days with food.   also recommend therapy for cervical radiculopathy.  Let us have her do a stool antigen test for H pylori as she has completed treatment.  If we can order a breath test that is fine as well.   Alert and oriented to person, place and time/Patient baseline mental status

## 2020-09-15 ENCOUNTER — APPOINTMENT (OUTPATIENT)
Dept: ORTHOPEDIC SURGERY | Facility: CLINIC | Age: 65
End: 2020-09-15
Payer: COMMERCIAL

## 2020-09-15 DIAGNOSIS — S62.639D DISPLACED FRACTURE OF DISTAL PHALANX OF UNSPECIFIED FINGER, SUBSEQUENT ENCOUNTER FOR FRACTURE WITH ROUTINE HEALING: ICD-10-CM

## 2020-09-15 PROCEDURE — 99214 OFFICE O/P EST MOD 30 MIN: CPT

## 2020-09-17 RX ORDER — AMLODIPINE BESYLATE 10 MG/1
10 TABLET ORAL
Qty: 30 | Refills: 0 | Status: ACTIVE | COMMUNITY
Start: 2020-08-22

## 2020-09-17 RX ORDER — ZOLPIDEM TARTRATE 5 MG/1
5 TABLET ORAL
Qty: 30 | Refills: 0 | Status: ACTIVE | COMMUNITY
Start: 2020-07-11

## 2020-09-17 RX ORDER — METOPROLOL SUCCINATE 50 MG/1
50 TABLET, EXTENDED RELEASE ORAL
Qty: 30 | Refills: 0 | Status: ACTIVE | COMMUNITY
Start: 2020-04-29

## 2020-09-17 RX ORDER — QUETIAPINE FUMARATE 100 MG/1
100 TABLET ORAL
Qty: 30 | Refills: 0 | Status: ACTIVE | COMMUNITY
Start: 2020-05-04

## 2020-09-17 RX ORDER — MEMANTINE HYDROCHLORIDE 10 MG/1
10 TABLET, FILM COATED ORAL
Qty: 30 | Refills: 0 | Status: ACTIVE | COMMUNITY
Start: 2020-08-22

## 2020-09-17 RX ORDER — RISPERIDONE 2 MG/1
2 TABLET, FILM COATED ORAL
Qty: 30 | Refills: 0 | Status: ACTIVE | COMMUNITY
Start: 2020-05-04

## 2020-09-17 RX ORDER — CEPHALEXIN 500 MG/1
500 CAPSULE ORAL
Qty: 21 | Refills: 0 | Status: ACTIVE | COMMUNITY
Start: 2020-08-31

## 2020-09-17 RX ORDER — DISULFIRAM 250 MG/1
250 TABLET ORAL
Qty: 15 | Refills: 0 | Status: ACTIVE | COMMUNITY
Start: 2020-08-18

## 2020-09-17 RX ORDER — OMEGA-3-ACID ETHYL ESTERS CAPSULES 1 G/1
1 CAPSULE, LIQUID FILLED ORAL
Qty: 120 | Refills: 0 | Status: ACTIVE | COMMUNITY
Start: 2020-04-29

## 2020-09-17 RX ORDER — NALTREXONE HYDROCHLORIDE 50 MG/1
50 TABLET, FILM COATED ORAL
Qty: 30 | Refills: 0 | Status: ACTIVE | COMMUNITY
Start: 2020-05-04

## 2020-09-22 ENCOUNTER — APPOINTMENT (OUTPATIENT)
Dept: ORTHOPEDIC SURGERY | Facility: CLINIC | Age: 65
End: 2020-09-22

## 2020-10-08 PROCEDURE — 93010 ELECTROCARDIOGRAM REPORT: CPT

## 2020-10-15 ENCOUNTER — EMERGENCY (EMERGENCY)
Facility: HOSPITAL | Age: 65
LOS: 0 days | Discharge: LEFT BEFORE DISCHARGE | End: 2020-10-16
Attending: EMERGENCY MEDICINE
Payer: COMMERCIAL

## 2020-10-15 VITALS
SYSTOLIC BLOOD PRESSURE: 131 MMHG | HEART RATE: 85 BPM | DIASTOLIC BLOOD PRESSURE: 82 MMHG | TEMPERATURE: 98 F | OXYGEN SATURATION: 99 % | HEIGHT: 72 IN | RESPIRATION RATE: 16 BRPM | WEIGHT: 203.93 LBS

## 2020-10-15 DIAGNOSIS — Z00.00 ENCOUNTER FOR GENERAL ADULT MEDICAL EXAMINATION WITHOUT ABNORMAL FINDINGS: ICD-10-CM

## 2020-10-15 DIAGNOSIS — I10 ESSENTIAL (PRIMARY) HYPERTENSION: ICD-10-CM

## 2020-10-15 DIAGNOSIS — M79.672 PAIN IN LEFT FOOT: ICD-10-CM

## 2020-10-15 DIAGNOSIS — S93.602A UNSPECIFIED SPRAIN OF LEFT FOOT, INITIAL ENCOUNTER: ICD-10-CM

## 2020-10-15 DIAGNOSIS — F10.26 ALCOHOL DEPENDENCE WITH ALCOHOL-INDUCED PERSISTING AMNESTIC DISORDER: ICD-10-CM

## 2020-10-15 DIAGNOSIS — R77.8 OTHER SPECIFIED ABNORMALITIES OF PLASMA PROTEINS: ICD-10-CM
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
## 2020-10-15 DIAGNOSIS — F01.50 VASCULAR DEMENTIA WITHOUT BEHAVIORAL DISTURBANCE: ICD-10-CM

## 2020-10-15 DIAGNOSIS — Z98.84 BARIATRIC SURGERY STATUS: Chronic | ICD-10-CM

## 2020-10-15 DIAGNOSIS — Z88.8 ALLERGY STATUS TO OTHER DRUGS, MEDICAMENTS AND BIOLOGICAL SUBSTANCES: ICD-10-CM

## 2020-10-15 DIAGNOSIS — R46.2 STRANGE AND INEXPLICABLE BEHAVIOR: ICD-10-CM
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
## 2020-10-15 DIAGNOSIS — Z98.890 OTHER SPECIFIED POSTPROCEDURAL STATES: Chronic | ICD-10-CM

## 2020-10-15 LAB
ALBUMIN SERPL ELPH-MCNC: 4.1 G/DL — SIGNIFICANT CHANGE UP (ref 3.3–5)
ALP SERPL-CCNC: 78 U/L — SIGNIFICANT CHANGE UP (ref 40–120)
ALT FLD-CCNC: 27 U/L — SIGNIFICANT CHANGE UP (ref 12–78)
AMPHET UR-MCNC: NEGATIVE — SIGNIFICANT CHANGE UP
ANION GAP SERPL CALC-SCNC: 8 MMOL/L — SIGNIFICANT CHANGE UP (ref 5–17)
APAP SERPL-MCNC: < 2 UG/ML (ref 10–30)
APTT BLD: 30.7 SEC — SIGNIFICANT CHANGE UP (ref 27.5–35.5)
AST SERPL-CCNC: 109 U/L — HIGH (ref 15–37)
BARBITURATES UR SCN-MCNC: NEGATIVE — SIGNIFICANT CHANGE UP
BASOPHILS # BLD AUTO: 0.06 K/UL — SIGNIFICANT CHANGE UP (ref 0–0.2)
BASOPHILS NFR BLD AUTO: 0.4 % — SIGNIFICANT CHANGE UP (ref 0–2)
BENZODIAZ UR-MCNC: NEGATIVE — SIGNIFICANT CHANGE UP
BILIRUB SERPL-MCNC: 1.3 MG/DL — HIGH (ref 0.2–1.2)
BUN SERPL-MCNC: 19 MG/DL — SIGNIFICANT CHANGE UP (ref 7–23)
CALCIUM SERPL-MCNC: 8.9 MG/DL — SIGNIFICANT CHANGE UP (ref 8.5–10.1)
CHLORIDE SERPL-SCNC: 107 MMOL/L — SIGNIFICANT CHANGE UP (ref 96–108)
CO2 SERPL-SCNC: 28 MMOL/L — SIGNIFICANT CHANGE UP (ref 22–31)
COCAINE METAB.OTHER UR-MCNC: NEGATIVE — SIGNIFICANT CHANGE UP
CREAT SERPL-MCNC: 1.46 MG/DL — HIGH (ref 0.5–1.3)
EOSINOPHIL # BLD AUTO: 0.17 K/UL — SIGNIFICANT CHANGE UP (ref 0–0.5)
EOSINOPHIL NFR BLD AUTO: 1.2 % — SIGNIFICANT CHANGE UP (ref 0–6)
ETHANOL SERPL-MCNC: <10 MG/DL — SIGNIFICANT CHANGE UP (ref 0–10)
GLUCOSE BLDC GLUCOMTR-MCNC: 137 MG/DL — HIGH (ref 70–99)
GLUCOSE SERPL-MCNC: 91 MG/DL — SIGNIFICANT CHANGE UP (ref 70–99)
HCT VFR BLD CALC: 43.6 % — SIGNIFICANT CHANGE UP (ref 39–50)
HGB BLD-MCNC: 14.8 G/DL — SIGNIFICANT CHANGE UP (ref 13–17)
IMM GRANULOCYTES NFR BLD AUTO: 0.3 % — SIGNIFICANT CHANGE UP (ref 0–1.5)
INR BLD: 1.15 RATIO — SIGNIFICANT CHANGE UP (ref 0.88–1.16)
LYMPHOCYTES # BLD AUTO: 14.7 % — SIGNIFICANT CHANGE UP (ref 13–44)
LYMPHOCYTES # BLD AUTO: 2.11 K/UL — SIGNIFICANT CHANGE UP (ref 1–3.3)
MAGNESIUM SERPL-MCNC: 2 MG/DL — SIGNIFICANT CHANGE UP (ref 1.6–2.6)
MCHC RBC-ENTMCNC: 29.1 PG — SIGNIFICANT CHANGE UP (ref 27–34)
MCHC RBC-ENTMCNC: 33.9 GM/DL — SIGNIFICANT CHANGE UP (ref 32–36)
MCV RBC AUTO: 85.8 FL — SIGNIFICANT CHANGE UP (ref 80–100)
METHADONE UR-MCNC: NEGATIVE — SIGNIFICANT CHANGE UP
MONOCYTES # BLD AUTO: 1.61 K/UL — HIGH (ref 0–0.9)
MONOCYTES NFR BLD AUTO: 11.3 % — SIGNIFICANT CHANGE UP (ref 2–14)
NEUTROPHILS # BLD AUTO: 10.32 K/UL — HIGH (ref 1.8–7.4)
NEUTROPHILS NFR BLD AUTO: 72.1 % — SIGNIFICANT CHANGE UP (ref 43–77)
NRBC # BLD: 0 /100 WBCS — SIGNIFICANT CHANGE UP (ref 0–0)
OPIATES UR-MCNC: NEGATIVE — SIGNIFICANT CHANGE UP
PCP SPEC-MCNC: SIGNIFICANT CHANGE UP
PCP UR-MCNC: NEGATIVE — SIGNIFICANT CHANGE UP
PLATELET # BLD AUTO: 228 K/UL — SIGNIFICANT CHANGE UP (ref 150–400)
POTASSIUM SERPL-MCNC: 3.3 MMOL/L — LOW (ref 3.5–5.3)
POTASSIUM SERPL-SCNC: 3.3 MMOL/L — LOW (ref 3.5–5.3)
PROT SERPL-MCNC: 7.9 GM/DL — SIGNIFICANT CHANGE UP (ref 6–8.3)
PROTHROM AB SERPL-ACNC: 13.3 SEC — SIGNIFICANT CHANGE UP (ref 10.6–13.6)
RBC # BLD: 5.08 M/UL — SIGNIFICANT CHANGE UP (ref 4.2–5.8)
RBC # FLD: 13.3 % — SIGNIFICANT CHANGE UP (ref 10.3–14.5)
SALICYLATES SERPL-MCNC: <1.7 MG/DL — LOW (ref 2.8–20)
SARS-COV-2 RNA SPEC QL NAA+PROBE: SIGNIFICANT CHANGE UP
SODIUM SERPL-SCNC: 143 MMOL/L — SIGNIFICANT CHANGE UP (ref 135–145)
THC UR QL: NEGATIVE — SIGNIFICANT CHANGE UP
TROPONIN I SERPL-MCNC: 0.33 NG/ML — HIGH (ref 0.01–0.04)
WBC # BLD: 14.31 K/UL — HIGH (ref 3.8–10.5)
WBC # FLD AUTO: 14.31 K/UL — HIGH (ref 3.8–10.5)

## 2020-10-15 PROCEDURE — 73630 X-RAY EXAM OF FOOT: CPT | Mod: 26,LT

## 2020-10-15 PROCEDURE — 70450 CT HEAD/BRAIN W/O DYE: CPT | Mod: 26,MA

## 2020-10-15 PROCEDURE — 71045 X-RAY EXAM CHEST 1 VIEW: CPT | Mod: 26

## 2020-10-15 PROCEDURE — 99285 EMERGENCY DEPT VISIT HI MDM: CPT

## 2020-10-15 RX ORDER — SODIUM CHLORIDE 9 MG/ML
1000 INJECTION INTRAMUSCULAR; INTRAVENOUS; SUBCUTANEOUS ONCE
Refills: 0 | Status: COMPLETED | OUTPATIENT
Start: 2020-10-15 | End: 2020-10-15

## 2020-10-15 RX ADMIN — SODIUM CHLORIDE 1000 MILLILITER(S): 9 INJECTION INTRAMUSCULAR; INTRAVENOUS; SUBCUTANEOUS at 23:30

## 2020-10-15 RX ADMIN — SODIUM CHLORIDE 1000 MILLILITER(S): 9 INJECTION INTRAMUSCULAR; INTRAVENOUS; SUBCUTANEOUS at 22:30

## 2020-10-15 NOTE — ED PROVIDER NOTE - CLINICAL SUMMARY MEDICAL DECISION MAKING FREE TEXT BOX
Patient presents to ER with wife who is concerned about changing mental state of patient.  VSS.  Patient does not display confusion in ER.  Labs concerning for elevated trop, however patient refuses EKG and admission, is stating that he wants to go and his rights are being violated.  Has mild leukocytosis similar to recent labs.  He denies chest pain and says that he will see his cardiologist.  PAtient has not displayed erratic behavior in ER.  Remaining labs with mildly elevated creatinine.  CXR with normal borders, poor inspiration, no consolidations.  Foot xray negative for fx, osteo.  Patient is ambulating in ER.   The patient has decided to leave against medical advice.  The patient is AAOx3, not intoxicated, and displays normal decision making ability.  Wife Sarah also called to try to change patient's mind but she was unable to convince her  to stay.  We discussed all risks, benefits, and alternatives to the progression of treatment and the potential dangers of leaving including but not limited to permanent disability, injury, and death.  The patient was instructed that they are welcome to change their decision to leave against medical advice and return to the emergency department at any time and for any reason in order to allow us to render care. Patient presents to ER with wife who is concerned about changing mental state of patient.  VSS.  Patient does not display confusion in ER.  Labs concerning for elevated trop, however patient refuses EKG and admission, is stating that he wants to go and his rights are being violated.  Has mild leukocytosis similar to recent labs.  He denies chest pain and says that he will see his cardiologist, wants to follow up with his own doctors.  PAtient has not displayed erratic behavior in ER.  Remaining labs with mildly elevated creatinine.  CXR with normal borders, poor inspiration, no consolidations.  Foot xray negative for fx, osteo.  Patient is ambulating in ER.   The patient has decided to leave against medical advice.  The patient is AAOx3, not intoxicated, and displays normal decision making ability.  Wife Sarah also called to try to change patient's mind but she was unable to convince her  to stay.  We discussed all risks, benefits, and alternatives to the progression of treatment and the potential dangers of leaving including but not limited to permanent disability, injury, and death.  The patient was instructed that they are welcome to change their decision to leave against medical advice and return to the emergency department at any time and for any reason in order to allow us to render care. Patient presents to ER with wife who is concerned about changing mental state of patient.  VSS.  Patient does not display confusion in ER.  Labs concerning for elevated trop, however patient refuses EKG and admission, is stating that he wants to go and his rights are being violated.  Has mild leukocytosis similar to recent labs.  He denies chest pain and says that he will see his cardiologist, wants to follow up with his own doctors.  PAtient has not displayed erratic behavior in ER.  Remaining labs with mildly elevated creatinine.  CXR with normal borders, poor inspiration, no consolidations.  Foot xray negative for fx, osteo.  Patient is ambulating in ER without issue.   The patient has decided to leave against medical advice.  The patient is AAOx3, not intoxicated, and displays normal decision making ability.  Wife Sarah also called to try to change patient's mind but she was unable to convince her  to stay.  We discussed all risks, benefits, and alternatives to the progression of treatment and the potential dangers of leaving including but not limited to permanent disability, injury, and death.  The patient was instructed that they are welcome to change their decision to leave against medical advice and return to the emergency department at any time and for any reason in order to allow us to render care. Patient presents to ER with wife who is concerned about changing mental state of patient.  VSS.  Patient does not display confusion in ER.  Labs concerning for elevated trop, however patient refuses EKG and admission, is stating that he wants to go and his rights are being violated.  Has mild leukocytosis similar to recent labs.  He denies chest pain and says that he will see his cardiologist, wants to follow up with his own doctors.  PAtient has not displayed erratic behavior in ER.  Remaining labs with mildly elevated creatinine.  CXR with normal borders, poor inspiration, no consolidations.  Foot xray negative for fx, osteo.  Patient is ambulating in ER without issue.   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  The patient has decided to leave against medical advice.  The patient is AAOx3, not intoxicated, and displays normal decision making ability.  Wife Sarah also called to try to change patient's mind but she was unable to convince her  to stay.  We discussed all risks, benefits, and alternatives to the progression of treatment and the potential dangers of leaving including but not limited to permanent disability, injury, and death.  The patient was instructed that they are welcome to change their decision to leave against medical advice and return to the emergency department at any time and for any reason in order to allow us to render care.

## 2020-10-15 NOTE — ED PROVIDER NOTE - PHYSICAL EXAMINATION
Gen: Alert, NAD, slightly disheveled  Head: NC, AT, PERRL, EOMI, normal lids/conjunctiva  ENT: normal hearing, patent oropharynx without erythema/exudate, uvula midline  Neck: +supple, no tenderness/meningismus/JVD, +Trachea midline  Pulm: Bilateral BS, normal resp effort, no wheeze/stridor/retractions  CV: RRR, no M/R/G, +dist pulses  Abd: soft, NT/ND, Negative [place] signs, +BS, no palpable masses  Mskel: no edema/erythema/cyanosis, no foot swelling  Skin: no rash, warm/dry  Neuro: AAOx3, no apparent sensory/motor deficits, coordination intact Gen: Alert, NAD, slightly disheveled  Head: NC, AT, PERRL, EOMI, normal lids/conjunctiva  ENT: normal hearing, patent oropharynx without erythema/exudate, uvula midline  Neck: +supple, no tenderness/meningismus/JVD, +Trachea midline  Pulm: Bilateral BS, normal resp effort, no wheeze/stridor/retractions  CV: RRR, no M/R/G, +dist pulses  Abd: soft, NT/ND, Negative [place] signs, +BS, no palpable masses  Mskel: no edema/erythema/cyanosis, no foot swelling, +mild bony tenderness to left mid 1st toe  Skin: no rash, warm/dry  Neuro: AAOx3, no apparent sensory/motor deficits, coordination intact

## 2020-10-15 NOTE — ED ADULT TRIAGE NOTE - CHIEF COMPLAINT QUOTE
per EMS pt was in rehab for alcoholism left earlier arrived home then when missing x 26ours. Pt admits to drinking beer yesterday sent by wife for eval. Pt denies fall, and pain. A & O x 4 in triage. wife Sarah cell [phone]

## 2020-10-15 NOTE — ED PROVIDER NOTE - CARE PLAN
Principal Discharge DX:	Korsakoff syndrome  Secondary Diagnosis:	Elevated troponin   Principal Discharge DX:	Korsakoff syndrome  Secondary Diagnosis:	Elevated troponin  Secondary Diagnosis:	Foot sprain, left, initial encounter

## 2020-10-15 NOTE — ED PROVIDER NOTE - PATIENT PORTAL LINK FT
You can access the FollowMyHealth Patient Portal offered by [facility] by registering at the following website: [email or web address]. By joining [employer]’s FollowMyHealth portal, you will also be able to view your health information using other applications (apps) compatible with our system.

## 2020-10-15 NOTE — ED ADULT TRIAGE NOTE - BMI (KG/M2)
Salo Altman is a 66 year old right handed male presenting to the clinic for parkinson's.  Patient drinks on average 1 cups of caffeine daily.    Eye Problem(s):glasses or contacts and double vision if not wearing driving glasses  ENT Problem(s):headaches  Cardiovascular problem(s):low bp when having syncope  Respiratory problem(s):negative  Gastro-intestinal problem(s):had a perferation about 1 year ago  Genito-urinary problem(s):negative  Musculoskeletal problem(s):back pain, pain to L leg  Integumentary problem(s):itching to lower extremities, but occasionally to chest  Neurological problem(s):syncope, tremor and unsteady gait and frequent falls  Psychiatric problem(s):negative  Endocrine problem(s):negative  Hematologic and/or Lymphatic problem(s):negative    Pt states he was diagnosed w/ Parkinson's dx in February 2019 by PCP Dr Goode.  Was seeing neurologist Hung prior to that for the tremors.  Tremors started about 2-3 years ago and have worsened in the last 3-4 months.  Wife is in the room and states that he has been having syncopal episodes at home, the last one being 1.5 weeks ago.  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        Time Position Blood Pressure Heart Rate Symptoms   1317 lying 172/107 56 none   1318 standing 142/100 60 Little dizzy   1319 standing 143/103 61 same   1320 standing 140/99 62 Same   1321 standing 143/95 60 none   1322 standing 143/103 61 None          27.7

## 2020-10-15 NOTE — ED ADULT NURSE NOTE - NSIMPLEMENTINTERV_GEN_ALL_ED
Implemented All Universal Safety Interventions:  [place] to call system. Call bell, personal items and telephone within reach. Instruct patient to call for assistance. Room bathroom lighting operational. Non-slip footwear when patient is off stretcher. Physically safe environment: no spills, clutter or unnecessary equipment. Stretcher in lowest position, wheels locked, appropriate side rails in place.

## 2020-10-15 NOTE — ED PROVIDER NOTE - OBJECTIVE STATEMENT
Pertinent PMH/PSH/FHx/SHx and Review of Systems contained within:     66 y/o M with pmhx HTN, alcohol abuse, Korsakoff is coming with wife after he had gone missing 26 hrs since 2:30 PM yesterday. Per wife pt had been in rehab for alcoholism up until x1 week ago and left a week earlier then he was suppose to. Pt had been home with wife and wife has been concerned as since pt's return pt has had acute mental status changes, forgetfulness, slurred speech. Wife reports pt has also been agitated and threatening her at home. Wife says yesterday the pt disappeared. Pt believes he was in [place] but cannot provide details. Pt says he drank x1 beer but says he has no further recollection. Pt in ED appears disheveled and is c/o pain to left big toe. Pt otherwise looks un-injured. Pt also recently picked up smoking, about x2 packs per day. He was not smoker prior to rehab. Pt's wife is concerned that due to increased forgetfulness and frequent slurring of speech that he may be suffering stroke. Pertinent PMH/PSH/FHx/SHx and Review of Systems contained within:     66 y/o M with pmhx HTN, alcohol abuse, Korsakoff Syndrome is coming with wife after he had gone missing 26 hrs since 2:30 PM yesterday. Per wife pt had been in rehab for alcoholism up until x1 week ago and left a week earlier then he was suppose to. Pt had been home with wife and wife has been concerned as since pt's return pt has had acute mental status changes, forgetfulness, irritability. Wife reports pt has been agitated and threatening her at home at home at times, no domestic abuse. Wife says yesterday the pt took a new credit card and disappeared. Pt believes he was in [place] but cannot provide details. Pt says he drank x1 beer but says he has no further recollection. Pt in ED appears slightly disheveled and is c/o pain to left big toe. Pt otherwise looks un-injured. Pt also recently picked up smoking, about x2 packs per day. He was not smoker prior to rehab. Pt's wife is concerned that due to increased forgetfulness and frequent slurring of speech that he may be suffering stroke however patient is very lucid and speaking clearly.  He does not feel he needs to be in the ER. Pertinent PMH/PSH/FHx/SHx and Review of Systems contained within:     66 y/o M with pmhx HTN, alcohol abuse, Korsakoff Syndrome is coming with wife after he had gone missing 26 hrs since 2:30 PM yesterday. Per wife pt had been in rehab for alcoholism up until x1 week ago and left a week earlier then he was suppose to. Pt had been home with wife and wife has been concerned as since pt's return pt has had acute mental status changes, forgetfulness, irritability. Wife reports pt has been agitated and threatening her at home at home at times, no domestic abuse. Wife says yesterday the pt took a new credit card and disappeared. Pt believes he was in [place] but cannot provide details. Pt says he drank x1 beer but says he has no further recollection. Pt in ED appears slightly disheveled and is c/o pain to left foot around the big toe. Pt otherwise looks un-injured. Pt also recently picked up smoking, about x2 packs per day. He was not smoker prior to rehab. Pt's wife is concerned that due to increased forgetfulness and frequent slurring of speech that he may be suffering stroke however patient is very lucid and speaking clearly.  He does not feel he needs to be in the ER.

## 2020-10-15 NOTE — ED ADULT NURSE NOTE - CHIEF COMPLAINT QUOTE
per EMS pt was in rehab for alcoholism left earlier arrived home then when missing x 26ours. Pt admits to drinking beer yesterday sent by wife for eval. Pt denies fall, and pain. A & O x 4 in triage. wife Sarah cell [phone]

## 2020-10-15 NOTE — ED ADULT NURSE NOTE - OBJECTIVE STATEMENT
per EMS pt was in rehab for alcoholism left earlier arrived home then when missing x 26ours. Pt admits to drinking beer yesterday sent by wife for eval. Pt denies fall, and pain. A & O x 4 in triage. wife Sarah cell [phone]

## 2020-10-15 NOTE — ED ADULT TRIAGE NOTE - TEMPERATURE IN FAHRENHEIT (DEGREES F)
43 yo female, PMH SLE on injectable solumedrol biweekly, hx/o Chiari malformation, PUD, cluster headaches, and varicose veins (treated with local sclerotherapy injections) and complicated by thrombophlebitis, presented to the ED c/o left lower extremity foot pain and erythema s/p varicose vein injection 4 days prior.  No hx/o fever, chills, chest pain, shortness of breath or cough.  In the ED on initial evaluation, WBC 10.72, coags/CMP unremarkable, , lactate 1.4.  LLE venous duplex US was performed: "......IMPRESSION:  No evidence of acute deep venous thrombosis in the left lower extremity.  Small amount of residual chronic thrombus in the distal (inferior) aspect of the left femoral vein.".  X-rays of left foot/ankle/tib/fib were negative for acute fracture or dislocation and there was no evidence for subcutaneous emphysema as per radiology official reports.  Pt was started on IV clindamycin for diagnosis of cellulitis and dispo'd to CDU for continued care plan:  IV antibiotics per orders, supportive care, general observation care / monitoring.  In the interim, pt objectively noted to be resting comfortably; no issues thus far.
97.8

## 2020-10-16 VITALS
DIASTOLIC BLOOD PRESSURE: 76 MMHG | TEMPERATURE: 98 F | OXYGEN SATURATION: 99 % | SYSTOLIC BLOOD PRESSURE: 125 MMHG | HEART RATE: 78 BPM | RESPIRATION RATE: 18 BRPM

## 2020-10-18 ENCOUNTER — INPATIENT (INPATIENT)
Facility: HOSPITAL | Age: 65
LOS: 1 days | Discharge: HOME | End: 2020-10-20
Attending: HOSPITALIST | Admitting: HOSPITALIST
Payer: COMMERCIAL

## 2020-10-18 VITALS
TEMPERATURE: 97 F | RESPIRATION RATE: 18 BRPM | SYSTOLIC BLOOD PRESSURE: 155 MMHG | HEART RATE: 85 BPM | HEIGHT: 72 IN | DIASTOLIC BLOOD PRESSURE: 90 MMHG | OXYGEN SATURATION: 97 %

## 2020-10-18 DIAGNOSIS — Z98.84 BARIATRIC SURGERY STATUS: Chronic | ICD-10-CM

## 2020-10-18 DIAGNOSIS — F19.929 OTHER PSYCHOACTIVE SUBSTANCE USE, UNSPECIFIED WITH INTOXICATION, UNSPECIFIED: ICD-10-CM

## 2020-10-18 DIAGNOSIS — Z98.890 OTHER SPECIFIED POSTPROCEDURAL STATES: Chronic | ICD-10-CM

## 2020-10-18 LAB
ALBUMIN SERPL ELPH-MCNC: 3.9 G/DL — SIGNIFICANT CHANGE UP (ref 3.3–5)
ALP SERPL-CCNC: 61 U/L — SIGNIFICANT CHANGE UP (ref 40–120)
ALT FLD-CCNC: 29 U/L — SIGNIFICANT CHANGE UP (ref 4–41)
AMPHET UR-MCNC: NEGATIVE — SIGNIFICANT CHANGE UP
ANION GAP SERPL CALC-SCNC: 10 MMO/L — SIGNIFICANT CHANGE UP (ref 7–14)
ANION GAP SERPL CALC-SCNC: 12 MMO/L — SIGNIFICANT CHANGE UP (ref 7–14)
APAP SERPL-MCNC: < 15 UG/ML — LOW (ref 15–25)
APPEARANCE UR: SIGNIFICANT CHANGE UP
APTT BLD: 32.2 SEC — SIGNIFICANT CHANGE UP (ref 27–36.3)
AST SERPL-CCNC: 82 U/L — HIGH (ref 4–40)
BACTERIA # UR AUTO: SIGNIFICANT CHANGE UP
BARBITURATES UR SCN-MCNC: NEGATIVE — SIGNIFICANT CHANGE UP
BASE EXCESS BLDV CALC-SCNC: 2.7 MMOL/L — SIGNIFICANT CHANGE UP
BASOPHILS # BLD AUTO: 0.02 K/UL — SIGNIFICANT CHANGE UP (ref 0–0.2)
BASOPHILS NFR BLD AUTO: 0.3 % — SIGNIFICANT CHANGE UP (ref 0–2)
BENZODIAZ UR-MCNC: NEGATIVE — SIGNIFICANT CHANGE UP
BILIRUB SERPL-MCNC: 0.7 MG/DL — SIGNIFICANT CHANGE UP (ref 0.2–1.2)
BILIRUB UR-MCNC: NEGATIVE — SIGNIFICANT CHANGE UP
BLOOD GAS VENOUS - CREATININE: 0.85 MG/DL — SIGNIFICANT CHANGE UP (ref 0.5–1.3)
BLOOD UR QL VISUAL: NEGATIVE — SIGNIFICANT CHANGE UP
BUN SERPL-MCNC: 10 MG/DL — SIGNIFICANT CHANGE UP (ref 7–23)
BUN SERPL-MCNC: 8 MG/DL — SIGNIFICANT CHANGE UP (ref 7–23)
CALCIUM SERPL-MCNC: 8.5 MG/DL — SIGNIFICANT CHANGE UP (ref 8.4–10.5)
CALCIUM SERPL-MCNC: 9 MG/DL — SIGNIFICANT CHANGE UP (ref 8.4–10.5)
CANNABINOIDS UR-MCNC: NEGATIVE — SIGNIFICANT CHANGE UP
CHLORIDE BLDV-SCNC: 115 MMOL/L — HIGH (ref 96–108)
CHLORIDE SERPL-SCNC: 108 MMOL/L — HIGH (ref 98–107)
CHLORIDE SERPL-SCNC: 110 MMOL/L — HIGH (ref 98–107)
CK SERPL-CCNC: 482 U/L — HIGH (ref 30–200)
CO2 SERPL-SCNC: 24 MMOL/L — SIGNIFICANT CHANGE UP (ref 22–31)
CO2 SERPL-SCNC: 26 MMOL/L — SIGNIFICANT CHANGE UP (ref 22–31)
COCAINE METAB.OTHER UR-MCNC: NEGATIVE — SIGNIFICANT CHANGE UP
COLOR SPEC: YELLOW — SIGNIFICANT CHANGE UP
CREAT SERPL-MCNC: 0.74 MG/DL — SIGNIFICANT CHANGE UP (ref 0.5–1.3)
CREAT SERPL-MCNC: 0.82 MG/DL — SIGNIFICANT CHANGE UP (ref 0.5–1.3)
EOSINOPHIL # BLD AUTO: 0.23 K/UL — SIGNIFICANT CHANGE UP (ref 0–0.5)
EOSINOPHIL NFR BLD AUTO: 3.9 % — SIGNIFICANT CHANGE UP (ref 0–6)
EPI CELLS # UR: SIGNIFICANT CHANGE UP
ETHANOL BLD-MCNC: < 10 MG/DL — SIGNIFICANT CHANGE UP
GAS PNL BLDV: 135 MMOL/L — LOW (ref 136–146)
GLUCOSE BLDV-MCNC: 165 MG/DL — HIGH (ref 70–99)
GLUCOSE SERPL-MCNC: 106 MG/DL — HIGH (ref 70–99)
GLUCOSE SERPL-MCNC: 86 MG/DL — SIGNIFICANT CHANGE UP (ref 70–99)
GLUCOSE UR-MCNC: NEGATIVE — SIGNIFICANT CHANGE UP
HCO3 BLDV-SCNC: 25 MMOL/L — SIGNIFICANT CHANGE UP (ref 20–27)
HCT VFR BLD CALC: 38.2 % — LOW (ref 39–50)
HCT VFR BLDV CALC: 41.8 % — SIGNIFICANT CHANGE UP (ref 39–51)
HGB BLD-MCNC: 12.9 G/DL — LOW (ref 13–17)
HGB BLDV-MCNC: 13.6 G/DL — SIGNIFICANT CHANGE UP (ref 13–17)
IMM GRANULOCYTES NFR BLD AUTO: 0.3 % — SIGNIFICANT CHANGE UP (ref 0–1.5)
INR BLD: 1.07 — SIGNIFICANT CHANGE UP (ref 0.88–1.16)
KETONES UR-MCNC: NEGATIVE — SIGNIFICANT CHANGE UP
LACTATE BLDV-MCNC: 1.3 MMOL/L — SIGNIFICANT CHANGE UP (ref 0.5–2)
LEUKOCYTE ESTERASE UR-ACNC: NEGATIVE — SIGNIFICANT CHANGE UP
LYMPHOCYTES # BLD AUTO: 0.9 K/UL — LOW (ref 1–3.3)
LYMPHOCYTES # BLD AUTO: 15.1 % — SIGNIFICANT CHANGE UP (ref 13–44)
MCHC RBC-ENTMCNC: 28.9 PG — SIGNIFICANT CHANGE UP (ref 27–34)
MCHC RBC-ENTMCNC: 33.8 % — SIGNIFICANT CHANGE UP (ref 32–36)
MCV RBC AUTO: 85.7 FL — SIGNIFICANT CHANGE UP (ref 80–100)
METHADONE UR-MCNC: NEGATIVE — SIGNIFICANT CHANGE UP
MONOCYTES # BLD AUTO: 0.66 K/UL — SIGNIFICANT CHANGE UP (ref 0–0.9)
MONOCYTES NFR BLD AUTO: 11.1 % — SIGNIFICANT CHANGE UP (ref 2–14)
NEUTROPHILS # BLD AUTO: 4.14 K/UL — SIGNIFICANT CHANGE UP (ref 1.8–7.4)
NEUTROPHILS NFR BLD AUTO: 69.3 % — SIGNIFICANT CHANGE UP (ref 43–77)
NITRITE UR-MCNC: NEGATIVE — SIGNIFICANT CHANGE UP
NRBC # FLD: 0 K/UL — SIGNIFICANT CHANGE UP (ref 0–0)
OPIATES UR-MCNC: NEGATIVE — SIGNIFICANT CHANGE UP
OSMOLALITY SERPL: 295 MOSMO/KG — SIGNIFICANT CHANGE UP (ref 275–295)
OXYCODONE UR-MCNC: NEGATIVE — SIGNIFICANT CHANGE UP
PCO2 BLDV: 55 MMHG — HIGH (ref 41–51)
PCP UR-MCNC: NEGATIVE — SIGNIFICANT CHANGE UP
PH BLDV: 7.33 PH — SIGNIFICANT CHANGE UP (ref 7.32–7.43)
PH UR: 6 — SIGNIFICANT CHANGE UP (ref 5–8)
PLATELET # BLD AUTO: 158 K/UL — SIGNIFICANT CHANGE UP (ref 150–400)
PMV BLD: 10.1 FL — SIGNIFICANT CHANGE UP (ref 7–13)
PO2 BLDV: 28 MMHG — LOW (ref 35–40)
POTASSIUM BLDV-SCNC: 3.1 MMOL/L — LOW (ref 3.4–4.5)
POTASSIUM SERPL-MCNC: 3 MMOL/L — LOW (ref 3.5–5.3)
POTASSIUM SERPL-MCNC: 3.3 MMOL/L — LOW (ref 3.5–5.3)
POTASSIUM SERPL-SCNC: 3 MMOL/L — LOW (ref 3.5–5.3)
POTASSIUM SERPL-SCNC: 3.3 MMOL/L — LOW (ref 3.5–5.3)
PROT SERPL-MCNC: 6.6 G/DL — SIGNIFICANT CHANGE UP (ref 6–8.3)
PROT UR-MCNC: 30 — SIGNIFICANT CHANGE UP
PROTHROM AB SERPL-ACNC: 12.3 SEC — SIGNIFICANT CHANGE UP (ref 10.6–13.6)
RBC # BLD: 4.46 M/UL — SIGNIFICANT CHANGE UP (ref 4.2–5.8)
RBC # FLD: 13.5 % — SIGNIFICANT CHANGE UP (ref 10.3–14.5)
RBC CASTS # UR COMP ASSIST: SIGNIFICANT CHANGE UP (ref 0–?)
SALICYLATES SERPL-MCNC: < 5 MG/DL — LOW (ref 15–30)
SAO2 % BLDV: 49 % — LOW (ref 60–85)
SODIUM SERPL-SCNC: 144 MMOL/L — SIGNIFICANT CHANGE UP (ref 135–145)
SODIUM SERPL-SCNC: 146 MMOL/L — HIGH (ref 135–145)
SP GR SPEC: 1.01 — SIGNIFICANT CHANGE UP (ref 1–1.04)
UROBILINOGEN FLD QL: NORMAL — SIGNIFICANT CHANGE UP
WBC # BLD: 5.97 K/UL — SIGNIFICANT CHANGE UP (ref 3.8–10.5)
WBC # FLD AUTO: 5.97 K/UL — SIGNIFICANT CHANGE UP (ref 3.8–10.5)
WBC UR QL: SIGNIFICANT CHANGE UP (ref 0–?)

## 2020-10-18 PROCEDURE — 99285 EMERGENCY DEPT VISIT HI MDM: CPT

## 2020-10-18 PROCEDURE — 70450 CT HEAD/BRAIN W/O DYE: CPT | Mod: 26

## 2020-10-18 RX ORDER — THIAMINE MONONITRATE (VIT B1) 100 MG
500 TABLET ORAL ONCE
Refills: 0 | Status: COMPLETED | OUTPATIENT
Start: 2020-10-18 | End: 2020-10-18

## 2020-10-18 RX ORDER — SODIUM CHLORIDE 9 MG/ML
1000 INJECTION, SOLUTION INTRAVENOUS
Refills: 0 | Status: DISCONTINUED | OUTPATIENT
Start: 2020-10-18 | End: 2020-10-18

## 2020-10-18 RX ORDER — POTASSIUM CHLORIDE 20 MEQ
10 PACKET (EA) ORAL ONCE
Refills: 0 | Status: COMPLETED | OUTPATIENT
Start: 2020-10-18 | End: 2020-10-18

## 2020-10-18 RX ORDER — SODIUM CHLORIDE 9 MG/ML
1000 INJECTION, SOLUTION INTRAVENOUS
Refills: 0 | Status: DISCONTINUED | OUTPATIENT
Start: 2020-10-18 | End: 2020-10-20

## 2020-10-18 RX ORDER — POTASSIUM CHLORIDE 20 MEQ
10 PACKET (EA) ORAL
Refills: 0 | Status: COMPLETED | OUTPATIENT
Start: 2020-10-18 | End: 2020-10-19

## 2020-10-18 RX ORDER — DEXTROSE 50 % IN WATER 50 %
50 SYRINGE (ML) INTRAVENOUS ONCE
Refills: 0 | Status: COMPLETED | OUTPATIENT
Start: 2020-10-18 | End: 2020-10-18

## 2020-10-18 RX ADMIN — Medication 100 MILLIEQUIVALENT(S): at 19:47

## 2020-10-18 RX ADMIN — Medication 105 MILLIGRAM(S): at 17:17

## 2020-10-18 RX ADMIN — SODIUM CHLORIDE 150 MILLILITER(S): 9 INJECTION, SOLUTION INTRAVENOUS at 18:07

## 2020-10-18 RX ADMIN — Medication 50 MILLILITER(S): at 18:06

## 2020-10-18 RX ADMIN — Medication 100 MILLIEQUIVALENT(S): at 23:11

## 2020-10-18 RX ADMIN — Medication 100 MILLIEQUIVALENT(S): at 22:28

## 2020-10-18 NOTE — ED PROVIDER NOTE - PHYSICAL EXAMINATION
Gen: chronically ill appearing male   HEENT: NCAT, EOMI, no nasal discharge, mucous membranes moist pupils 2mm equal and reactive   CV: RRR, +S1/S2, no M/R/G  Resp: CTAB, no W/R/R  GI: Abdomen soft non-distended, NTTP, no masses  MSK: multiple superficial excoriations  Neuro: A&Ox2 (self and place), moving all four extremities spontaneously  Psych: somnolent

## 2020-10-18 NOTE — ED PROVIDER NOTE - ATTENDING CONTRIBUTION TO CARE
66 y/o M PMH korsakoff syndrome 2/2 alcohol abuse presents to the ED via EMS by wife from home c/o sleepiness s/p taking 4 pills of 400mg seroquel last night at 2am. ddx toxic ingestion, alcohol abuse, uti, arrythmia. plan labs ua ekg, tox consult, reassess. pt denies SI, pt states he just wants to sleep.   GEN - somnolent but arousable   HEAD - NC/AT     EYES - EOMI, no conjunctival pallor, no scleral icterus  ENT -   mucous membranes  moist , no discharge      NECK - Neck supple  PULM - CTA b/l,  symmetric breath sounds  COR -  RRR, S1 S2, no murmurs  ABD - , ND, NT, soft, no guarding, no rebound, no masses    BACK - no CVA tenderness, nontender spine     EXTREMS - no edema, no deformity, warm and well perfused    SKIN - no rash or bruising      NEUROLOGIC - somnolent but arousable , withdraws from pain

## 2020-10-18 NOTE — ED ADULT NURSE NOTE - INTERVENTIONS DEFINITIONS
Room bathroom lighting operational/Non-slip footwear when patient is off stretcher/Monitor for mental status changes and reorient to person, place, and time/Instruct patient to call for assistance/Physically safe environment: no spills, clutter or unnecessary equipment/Call bell, personal items and telephone within reach/Reinforce activity limits and safety measures with patient and family/Review medications for side effects contributing to fall risk/[place] to call system/Stretcher in lowest position, wheels locked, appropriate side rails in place/Monitor gait and stability

## 2020-10-18 NOTE — ED PROVIDER NOTE - OBJECTIVE STATEMENT
66 y/o M PM korsakoff syndrome 2/2 alcohol abuse presents to the ED via EMS by wife from home c/o sleepiness s/p taking 4 pills of 400mg seroquel last night at 2am. Pt recently self-discharged from rehab for alcohol abuse 9 days ago. Also seen at [facility] 3 days ago after alcohol intoxication. Wife denies f/c, cough, congestion, abd pain, n/v/d, dysuria, hematuria. Pt somnolent in the ED, but arousable. states he took 4 pills, denies SI, not answering other questions.

## 2020-10-18 NOTE — ED ADULT NURSE NOTE - CHIEF COMPLAINT QUOTE
Pt BIBA from home after taking 1600mg of Seroquel to help him sleep. Pt lethargic but arousable. 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 Was missing for 24hrs recently and taken to [facility]. Pt noted to be covered in scabs and scratches.

## 2020-10-18 NOTE — ED ADULT TRIAGE NOTE - CHIEF COMPLAINT QUOTE
Pt BIBA from home after taking 1600mg of Seroquel to help him sleep. Pt lethargic but arousable. 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 Was missing for 24hrs recently and taken to [facility]. Pt noted to be covered in scabs and scratches.

## 2020-10-18 NOTE — ED ADULT NURSE NOTE - OBJECTIVE STATEMENT
Pt BIBA from home. Pt hard to arouse. Pt aroused when moving from stretcher to another stretcher. Currently sleeping in bed. Per EMS, pt told his wife that he took 1600mg of Seroquel ( in ) to help him sleep. Wife found him in the basement and called 911. Per EMS, pt has hx of substance abuse, alcoholism. Pt has mhx of Korasakoff syndrome. Per EMS, pt went missing 2 days ago and was taken to [facility]. Per EMS, pt's wife told them the pt has not been himself lately. On inspection, pt has scabs and abrasions on b/l lower arms and lower legs. Pt placed on cardiac monitor, reading sinus. Pt respiration 12. Pt saturating at 99% on RA. 20G placed in RAC. Pt stripped off his own clothes and covered in hospital gown. Safety precautions maintained. Pt BIBA from home. Pt hard to arouse. Pt aroused when moving from stretcher to another stretcher. Currently sleeping in bed. Per EMS, pt told his wife that he took 1600mg of Seroquel ( in ) to help him sleep. Wife found him in the basement and called 911. Per EMS, pt has hx of substance abuse, alcoholism. Pt has mhx of Korasakoff syndrome. Per EMS, pt went missing 2 days ago, his car was found abandoned at [place]. Per EMS, pt's wife told them the pt has not been himself lately. On inspection, pt has scabs and abrasions on b/l lower arms and lower legs. Pt placed on cardiac monitor, reading sinus. Pt respiration 12. Pt saturating at 99% on RA. 20G placed in RAC. Pt stripped off his own clothes and covered in hospital gown. Safety precautions maintained. Pt BIBA from home. Pt hard to arouse. Pt aroused when moving from stretcher to another stretcher. Currently sleeping in bed. Per EMS, pt told his wife that he took 1600mg of Seroquel ( in ) to help him sleep. Wife found him in the basement and called 911. Per EMS, pt has hx of substance abuse, alcoholism. Pt has mhx of Korasakoff syndrome. Per EMS, pt went missing 2 days ago, his car was found abandoned at [place]. Per EMS, pt's wife told them the pt has not been himself lately. On inspection, pt has scabs and abrasions on b/l lower arms and lower legs. Pt placed on cardiac monitor, reading sinus. Pt respiration 12. Pt saturating at 99% on RA. 20G placed in RAC. 20G LLF. Pt stripped off his own clothes and covered in hospital gown. Safety precautions maintained.

## 2020-10-18 NOTE — ED PROVIDER NOTE - PROGRESS NOTE DETAILS
Ford, PGY2 - discussed with toxicology fellow Dr. Rodriguez will consult Ford, PGY2 - labs back, no elevated wbc, ck mildly elevated 847, Cr 0.82, tsh 0.22, BRB 0.4. discussed with toxicology. serum tox not resulted. discussed with lab- report they have no red top. will send blood gas, rpt gold top for serum osm and red top for serum tox. dispo pending. Pt signed out to me. Pt now more responsive - awakens to name. Will send off rpt cpk and bmp, QTc normal. Admitted to hospitalist. Lucia Castaneda, EM PGY2

## 2020-10-18 NOTE — ED ADULT NURSE REASSESSMENT NOTE - NS ED NURSE REASSESS COMMENT FT1
Received report from sonny RN. Pt. a&ox4 and in no acute distress. NSR on cardiac monitor. Respirations appear unlabored. Pt. arousable to verbal stimuli.   VS as noted. Awaiting further orders. Will continue to monitor.

## 2020-10-18 NOTE — ED ADULT TRIAGE NOTE - RESPIRATORY RATE (BREATHS/MIN)
Patient states receiving call from [facility] advising her that she is positive for gonorrhea and chlamydia. 18

## 2020-10-18 NOTE — ED PROVIDER NOTE - CLINICAL SUMMARY MEDICAL DECISION MAKING FREE TEXT BOX
66 y/o M PM korsakoff syndrome 2/2 alcohol abuse presents to the ED via EMS by wife from home c/o sleepiness s/p taking 4 pills of 400mg seroquel last night at 2am. ddx toxic ingestion, alcohol abuse, uti, arrythmia. plan labs ua ekg toxicology consult reassess when more awake

## 2020-10-19 DIAGNOSIS — R74.8 ABNORMAL LEVELS OF OTHER SERUM ENZYMES: ICD-10-CM

## 2020-10-19 DIAGNOSIS — M62.82 RHABDOMYOLYSIS: ICD-10-CM

## 2020-10-19 DIAGNOSIS — Z02.9 ENCOUNTER FOR ADMINISTRATIVE EXAMINATIONS, UNSPECIFIED: ICD-10-CM

## 2020-10-19 DIAGNOSIS — R47.81 SLURRED SPEECH: ICD-10-CM

## 2020-10-19 DIAGNOSIS — Z29.9 ENCOUNTER FOR PROPHYLACTIC MEASURES, UNSPECIFIED: ICD-10-CM

## 2020-10-19 DIAGNOSIS — E07.9 DISORDER OF THYROID, UNSPECIFIED: ICD-10-CM

## 2020-10-19 DIAGNOSIS — F01.50 VASCULAR DEMENTIA WITHOUT BEHAVIORAL DISTURBANCE: ICD-10-CM

## 2020-10-19 DIAGNOSIS — I10 ESSENTIAL (PRIMARY) HYPERTENSION: ICD-10-CM

## 2020-10-19 DIAGNOSIS — F31.9 BIPOLAR DISORDER, UNSPECIFIED: ICD-10-CM

## 2020-10-19 DIAGNOSIS — F19.929 OTHER PSYCHOACTIVE SUBSTANCE USE, UNSPECIFIED WITH INTOXICATION, UNSPECIFIED: ICD-10-CM

## 2020-10-19 DIAGNOSIS — F10.10 ALCOHOL ABUSE, UNCOMPLICATED: ICD-10-CM

## 2020-10-19 DIAGNOSIS — E87.6 HYPOKALEMIA: ICD-10-CM

## 2020-10-19 LAB
ALBUMIN SERPL ELPH-MCNC: 3.9 G/DL — SIGNIFICANT CHANGE UP (ref 3.3–5)
ALBUMIN SERPL ELPH-MCNC: 4.1 G/DL — SIGNIFICANT CHANGE UP (ref 3.3–5)
ALP SERPL-CCNC: 69 U/L — SIGNIFICANT CHANGE UP (ref 40–120)
ALP SERPL-CCNC: 74 U/L — SIGNIFICANT CHANGE UP (ref 40–120)
ALT FLD-CCNC: 23 U/L — SIGNIFICANT CHANGE UP (ref 4–41)
ALT FLD-CCNC: 29 U/L — SIGNIFICANT CHANGE UP (ref 4–41)
ANION GAP SERPL CALC-SCNC: 12 MMO/L — SIGNIFICANT CHANGE UP (ref 7–14)
ANION GAP SERPL CALC-SCNC: 13 MMO/L — SIGNIFICANT CHANGE UP (ref 7–14)
APPEARANCE UR: CLEAR — SIGNIFICANT CHANGE UP
APTT BLD: 31.5 SEC — SIGNIFICANT CHANGE UP (ref 27–36.3)
AST SERPL-CCNC: 35 U/L — SIGNIFICANT CHANGE UP (ref 4–40)
AST SERPL-CCNC: 62 U/L — HIGH (ref 4–40)
BASE EXCESS BLDV CALC-SCNC: 0.9 MMOL/L — SIGNIFICANT CHANGE UP
BASOPHILS # BLD AUTO: 0.04 K/UL — SIGNIFICANT CHANGE UP (ref 0–0.2)
BASOPHILS # BLD AUTO: 0.04 K/UL — SIGNIFICANT CHANGE UP (ref 0–0.2)
BASOPHILS NFR BLD AUTO: 0.4 % — SIGNIFICANT CHANGE UP (ref 0–2)
BASOPHILS NFR BLD AUTO: 0.4 % — SIGNIFICANT CHANGE UP (ref 0–2)
BILIRUB SERPL-MCNC: 0.6 MG/DL — SIGNIFICANT CHANGE UP (ref 0.2–1.2)
BILIRUB SERPL-MCNC: 0.6 MG/DL — SIGNIFICANT CHANGE UP (ref 0.2–1.2)
BILIRUB UR-MCNC: NEGATIVE — SIGNIFICANT CHANGE UP
BLOOD GAS VENOUS - CREATININE: 0.78 MG/DL — SIGNIFICANT CHANGE UP (ref 0.5–1.3)
BLOOD UR QL VISUAL: NEGATIVE — SIGNIFICANT CHANGE UP
BUN SERPL-MCNC: 4 MG/DL — LOW (ref 7–23)
BUN SERPL-MCNC: 5 MG/DL — LOW (ref 7–23)
CALCIUM SERPL-MCNC: 9.2 MG/DL — SIGNIFICANT CHANGE UP (ref 8.4–10.5)
CALCIUM SERPL-MCNC: 9.4 MG/DL — SIGNIFICANT CHANGE UP (ref 8.4–10.5)
CHLORIDE BLDV-SCNC: 111 MMOL/L — HIGH (ref 96–108)
CHLORIDE SERPL-SCNC: 107 MMOL/L — SIGNIFICANT CHANGE UP (ref 98–107)
CHLORIDE SERPL-SCNC: 108 MMOL/L — HIGH (ref 98–107)
CHOLEST SERPL-MCNC: 197 MG/DL — SIGNIFICANT CHANGE UP (ref 120–199)
CK MB BLD-MCNC: 1.2 — SIGNIFICANT CHANGE UP (ref 0–2.5)
CK MB BLD-MCNC: 4.48 NG/ML — SIGNIFICANT CHANGE UP (ref 1–6.6)
CK SERPL-CCNC: 246 U/L — HIGH (ref 30–200)
CK SERPL-CCNC: 377 U/L — HIGH (ref 30–200)
CO2 SERPL-SCNC: 23 MMOL/L — SIGNIFICANT CHANGE UP (ref 22–31)
CO2 SERPL-SCNC: 23 MMOL/L — SIGNIFICANT CHANGE UP (ref 22–31)
COLOR SPEC: YELLOW — SIGNIFICANT CHANGE UP
CREAT SERPL-MCNC: 0.68 MG/DL — SIGNIFICANT CHANGE UP (ref 0.5–1.3)
CREAT SERPL-MCNC: 0.73 MG/DL — SIGNIFICANT CHANGE UP (ref 0.5–1.3)
EOSINOPHIL # BLD AUTO: 0.27 K/UL — SIGNIFICANT CHANGE UP (ref 0–0.5)
EOSINOPHIL # BLD AUTO: 0.3 K/UL — SIGNIFICANT CHANGE UP (ref 0–0.5)
EOSINOPHIL NFR BLD AUTO: 3 % — SIGNIFICANT CHANGE UP (ref 0–6)
EOSINOPHIL NFR BLD AUTO: 3 % — SIGNIFICANT CHANGE UP (ref 0–6)
GAS PNL BLDV: 140 MMOL/L — SIGNIFICANT CHANGE UP (ref 136–146)
GLUCOSE BLDV-MCNC: 104 MG/DL — HIGH (ref 70–99)
GLUCOSE SERPL-MCNC: 103 MG/DL — HIGH (ref 70–99)
GLUCOSE SERPL-MCNC: 89 MG/DL — SIGNIFICANT CHANGE UP (ref 70–99)
GLUCOSE UR-MCNC: NEGATIVE — SIGNIFICANT CHANGE UP
HBA1C BLD-MCNC: 4.7 % — SIGNIFICANT CHANGE UP (ref 4–5.6)
HCO3 BLDV-SCNC: 25 MMOL/L — SIGNIFICANT CHANGE UP (ref 20–27)
HCT VFR BLD CALC: 40.5 % — SIGNIFICANT CHANGE UP (ref 39–50)
HCT VFR BLD CALC: 44.9 % — SIGNIFICANT CHANGE UP (ref 39–50)
HCT VFR BLDV CALC: 45.4 % — SIGNIFICANT CHANGE UP (ref 39–51)
HCV AB S/CO SERPL IA: 0.07 S/CO — SIGNIFICANT CHANGE UP (ref 0–0.99)
HCV AB SERPL-IMP: SIGNIFICANT CHANGE UP
HDLC SERPL-MCNC: 52 MG/DL — SIGNIFICANT CHANGE UP (ref 35–55)
HGB BLD-MCNC: 14 G/DL — SIGNIFICANT CHANGE UP (ref 13–17)
HGB BLD-MCNC: 15.1 G/DL — SIGNIFICANT CHANGE UP (ref 13–17)
HGB BLDV-MCNC: 14.8 G/DL — SIGNIFICANT CHANGE UP (ref 13–17)
IMM GRANULOCYTES NFR BLD AUTO: 0.3 % — SIGNIFICANT CHANGE UP (ref 0–1.5)
IMM GRANULOCYTES NFR BLD AUTO: 0.4 % — SIGNIFICANT CHANGE UP (ref 0–1.5)
INR BLD: 1.11 — SIGNIFICANT CHANGE UP (ref 0.88–1.16)
KETONES UR-MCNC: NEGATIVE — SIGNIFICANT CHANGE UP
LACTATE BLDV-MCNC: 1.8 MMOL/L — SIGNIFICANT CHANGE UP (ref 0.5–2)
LACTATE SERPL-SCNC: 1.7 MMOL/L — SIGNIFICANT CHANGE UP (ref 0.5–2)
LDH SERPL L TO P-CCNC: 194 U/L — SIGNIFICANT CHANGE UP (ref 135–225)
LEUKOCYTE ESTERASE UR-ACNC: NEGATIVE — SIGNIFICANT CHANGE UP
LIPID PNL WITH DIRECT LDL SERPL: 130 MG/DL — SIGNIFICANT CHANGE UP
LYMPHOCYTES # BLD AUTO: 1.09 K/UL — SIGNIFICANT CHANGE UP (ref 1–3.3)
LYMPHOCYTES # BLD AUTO: 1.25 K/UL — SIGNIFICANT CHANGE UP (ref 1–3.3)
LYMPHOCYTES # BLD AUTO: 12.2 % — LOW (ref 13–44)
LYMPHOCYTES # BLD AUTO: 12.6 % — LOW (ref 13–44)
MCHC RBC-ENTMCNC: 28.9 PG — SIGNIFICANT CHANGE UP (ref 27–34)
MCHC RBC-ENTMCNC: 29.3 PG — SIGNIFICANT CHANGE UP (ref 27–34)
MCHC RBC-ENTMCNC: 33.6 % — SIGNIFICANT CHANGE UP (ref 32–36)
MCHC RBC-ENTMCNC: 34.6 % — SIGNIFICANT CHANGE UP (ref 32–36)
MCV RBC AUTO: 83.7 FL — SIGNIFICANT CHANGE UP (ref 80–100)
MCV RBC AUTO: 87 FL — SIGNIFICANT CHANGE UP (ref 80–100)
MONOCYTES # BLD AUTO: 0.71 K/UL — SIGNIFICANT CHANGE UP (ref 0–0.9)
MONOCYTES # BLD AUTO: 0.88 K/UL — SIGNIFICANT CHANGE UP (ref 0–0.9)
MONOCYTES NFR BLD AUTO: 7.9 % — SIGNIFICANT CHANGE UP (ref 2–14)
MONOCYTES NFR BLD AUTO: 8.8 % — SIGNIFICANT CHANGE UP (ref 2–14)
NEUTROPHILS # BLD AUTO: 6.82 K/UL — SIGNIFICANT CHANGE UP (ref 1.8–7.4)
NEUTROPHILS # BLD AUTO: 7.44 K/UL — HIGH (ref 1.8–7.4)
NEUTROPHILS NFR BLD AUTO: 74.8 % — SIGNIFICANT CHANGE UP (ref 43–77)
NEUTROPHILS NFR BLD AUTO: 76.2 % — SIGNIFICANT CHANGE UP (ref 43–77)
NITRITE UR-MCNC: NEGATIVE — SIGNIFICANT CHANGE UP
NRBC # FLD: 0 K/UL — SIGNIFICANT CHANGE UP (ref 0–0)
NRBC # FLD: 0 K/UL — SIGNIFICANT CHANGE UP (ref 0–0)
PCO2 BLDV: 38 MMHG — LOW (ref 41–51)
PH BLDV: 7.43 PH — SIGNIFICANT CHANGE UP (ref 7.32–7.43)
PH UR: 6.5 — SIGNIFICANT CHANGE UP (ref 5–8)
PLATELET # BLD AUTO: 191 K/UL — SIGNIFICANT CHANGE UP (ref 150–400)
PLATELET # BLD AUTO: 211 K/UL — SIGNIFICANT CHANGE UP (ref 150–400)
PMV BLD: 10.4 FL — SIGNIFICANT CHANGE UP (ref 7–13)
PMV BLD: 9.5 FL — SIGNIFICANT CHANGE UP (ref 7–13)
PO2 BLDV: 47 MMHG — HIGH (ref 35–40)
POTASSIUM BLDV-SCNC: 3.2 MMOL/L — LOW (ref 3.4–4.5)
POTASSIUM SERPL-MCNC: 3.6 MMOL/L — SIGNIFICANT CHANGE UP (ref 3.5–5.3)
POTASSIUM SERPL-MCNC: 4.1 MMOL/L — SIGNIFICANT CHANGE UP (ref 3.5–5.3)
POTASSIUM SERPL-SCNC: 3.6 MMOL/L — SIGNIFICANT CHANGE UP (ref 3.5–5.3)
POTASSIUM SERPL-SCNC: 4.1 MMOL/L — SIGNIFICANT CHANGE UP (ref 3.5–5.3)
PROT SERPL-MCNC: 6.2 G/DL — SIGNIFICANT CHANGE UP (ref 6–8.3)
PROT SERPL-MCNC: 7.3 G/DL — SIGNIFICANT CHANGE UP (ref 6–8.3)
PROT UR-MCNC: 10 — SIGNIFICANT CHANGE UP
PROTHROM AB SERPL-ACNC: 12.7 SEC — SIGNIFICANT CHANGE UP (ref 10.6–13.6)
RBC # BLD: 4.84 M/UL — SIGNIFICANT CHANGE UP (ref 4.2–5.8)
RBC # BLD: 5.16 M/UL — SIGNIFICANT CHANGE UP (ref 4.2–5.8)
RBC # FLD: 13.3 % — SIGNIFICANT CHANGE UP (ref 10.3–14.5)
RBC # FLD: 13.8 % — SIGNIFICANT CHANGE UP (ref 10.3–14.5)
SAO2 % BLDV: 82.1 % — SIGNIFICANT CHANGE UP (ref 60–85)
SARS-COV-2 RNA SPEC QL NAA+PROBE: SIGNIFICANT CHANGE UP
SODIUM SERPL-SCNC: 143 MMOL/L — SIGNIFICANT CHANGE UP (ref 135–145)
SODIUM SERPL-SCNC: 143 MMOL/L — SIGNIFICANT CHANGE UP (ref 135–145)
SP GR SPEC: 1.01 — SIGNIFICANT CHANGE UP (ref 1–1.04)
T3 SERPL-MCNC: 62.1 NG/DL — LOW (ref 80–200)
T4 AB SER-ACNC: 5.7 UG/DL — SIGNIFICANT CHANGE UP (ref 5.1–13)
TRIGL SERPL-MCNC: 116 MG/DL — SIGNIFICANT CHANGE UP (ref 10–149)
TSH SERPL-MCNC: 0.12 UIU/ML — LOW (ref 0.27–4.2)
UROBILINOGEN FLD QL: NORMAL — SIGNIFICANT CHANGE UP
WBC # BLD: 8.96 K/UL — SIGNIFICANT CHANGE UP (ref 3.8–10.5)
WBC # BLD: 9.95 K/UL — SIGNIFICANT CHANGE UP (ref 3.8–10.5)
WBC # FLD AUTO: 8.96 K/UL — SIGNIFICANT CHANGE UP (ref 3.8–10.5)
WBC # FLD AUTO: 9.95 K/UL — SIGNIFICANT CHANGE UP (ref 3.8–10.5)

## 2020-10-19 PROCEDURE — 99223 1ST HOSP IP/OBS HIGH 75: CPT

## 2020-10-19 PROCEDURE — 70551 MRI BRAIN STEM W/O DYE: CPT | Mod: 26

## 2020-10-19 PROCEDURE — 71045 X-RAY EXAM CHEST 1 VIEW: CPT | Mod: 26

## 2020-10-19 PROCEDURE — 12345: CPT | Mod: NC

## 2020-10-19 RX ORDER — LANOLIN ALCOHOL/MO/W.PET/CERES
3 CREAM (GRAM) TOPICAL AT BEDTIME
Refills: 0 | Status: DISCONTINUED | OUTPATIENT
Start: 2020-10-19 | End: 2020-10-20

## 2020-10-19 RX ORDER — DONEPEZIL HYDROCHLORIDE 10 MG/1
1 TABLET, FILM COATED ORAL
Qty: 0 | Refills: 1 | DISCHARGE

## 2020-10-19 RX ORDER — MEMANTINE HYDROCHLORIDE 10 MG/1
10 TABLET ORAL DAILY
Refills: 0 | Status: DISCONTINUED | OUTPATIENT
Start: 2020-10-19 | End: 2020-10-20

## 2020-10-19 RX ORDER — ACETAMINOPHEN 500 MG
650 TABLET ORAL ONCE
Refills: 0 | Status: COMPLETED | OUTPATIENT
Start: 2020-10-19 | End: 2020-10-19

## 2020-10-19 RX ORDER — NICOTINE POLACRILEX 2 MG
1 GUM BUCCAL DAILY
Refills: 0 | Status: DISCONTINUED | OUTPATIENT
Start: 2020-10-19 | End: 2020-10-20

## 2020-10-19 RX ORDER — ALPRAZOLAM 0.25 MG
0.25 TABLET ORAL ONCE
Refills: 0 | Status: DISCONTINUED | OUTPATIENT
Start: 2020-10-19 | End: 2020-10-19

## 2020-10-19 RX ORDER — HALOPERIDOL DECANOATE 100 MG/ML
5 INJECTION INTRAMUSCULAR EVERY 6 HOURS
Refills: 0 | Status: DISCONTINUED | OUTPATIENT
Start: 2020-10-19 | End: 2020-10-20

## 2020-10-19 RX ORDER — OMEGA-3 ACID ETHYL ESTERS 1 G
2 CAPSULE ORAL
Refills: 0 | Status: DISCONTINUED | OUTPATIENT
Start: 2020-10-19 | End: 2020-10-20

## 2020-10-19 RX ORDER — AMLODIPINE BESYLATE 2.5 MG/1
10 TABLET ORAL DAILY
Refills: 0 | Status: DISCONTINUED | OUTPATIENT
Start: 2020-10-19 | End: 2020-10-20

## 2020-10-19 RX ORDER — HEPARIN SODIUM 5000 [USP'U]/ML
5000 INJECTION INTRAVENOUS; SUBCUTANEOUS EVERY 12 HOURS
Refills: 0 | Status: DISCONTINUED | OUTPATIENT
Start: 2020-10-19 | End: 2020-10-19

## 2020-10-19 RX ORDER — OMEGA-3 ACID ETHYL ESTERS 1 G
0 CAPSULE ORAL
Qty: 0 | Refills: 0 | DISCHARGE

## 2020-10-19 RX ORDER — MEMANTINE HYDROCHLORIDE 10 MG/1
1 TABLET ORAL
Qty: 0 | Refills: 0 | DISCHARGE

## 2020-10-19 RX ORDER — OMEGA-3 ACID ETHYL ESTERS 1 G
2 CAPSULE ORAL
Qty: 0 | Refills: 0 | DISCHARGE

## 2020-10-19 RX ORDER — OXCARBAZEPINE 300 MG/1
0 TABLET, FILM COATED ORAL
Qty: 0 | Refills: 0 | DISCHARGE

## 2020-10-19 RX ORDER — THIAMINE MONONITRATE (VIT B1) 100 MG
100 TABLET ORAL DAILY
Refills: 0 | Status: DISCONTINUED | OUTPATIENT
Start: 2020-10-19 | End: 2020-10-20

## 2020-10-19 RX ORDER — AMLODIPINE BESYLATE 2.5 MG/1
1 TABLET ORAL
Qty: 0 | Refills: 0 | DISCHARGE

## 2020-10-19 RX ORDER — INFLUENZA VIRUS VACCINE 15; 15; 15; 15 UG/.5ML; UG/.5ML; UG/.5ML; UG/.5ML
0.5 SUSPENSION INTRAMUSCULAR ONCE
Refills: 0 | Status: DISCONTINUED | OUTPATIENT
Start: 2020-10-19 | End: 2020-10-20

## 2020-10-19 RX ORDER — DONEPEZIL HYDROCHLORIDE 10 MG/1
10 TABLET, FILM COATED ORAL AT BEDTIME
Refills: 0 | Status: DISCONTINUED | OUTPATIENT
Start: 2020-10-19 | End: 2020-10-20

## 2020-10-19 RX ORDER — FOLIC ACID 0.8 MG
1 TABLET ORAL DAILY
Refills: 0 | Status: DISCONTINUED | OUTPATIENT
Start: 2020-10-19 | End: 2020-10-20

## 2020-10-19 RX ORDER — DONEPEZIL HYDROCHLORIDE 10 MG/1
0 TABLET, FILM COATED ORAL
Qty: 0 | Refills: 1 | DISCHARGE

## 2020-10-19 RX ADMIN — Medication 2 MILLIGRAM(S): at 10:57

## 2020-10-19 RX ADMIN — SODIUM CHLORIDE 150 MILLILITER(S): 9 INJECTION, SOLUTION INTRAVENOUS at 21:13

## 2020-10-19 RX ADMIN — Medication 3 MILLIGRAM(S): at 21:23

## 2020-10-19 RX ADMIN — Medication 1 MILLIGRAM(S): at 13:56

## 2020-10-19 RX ADMIN — Medication 2 GRAM(S): at 07:48

## 2020-10-19 RX ADMIN — AMLODIPINE BESYLATE 10 MILLIGRAM(S): 2.5 TABLET ORAL at 07:05

## 2020-10-19 RX ADMIN — Medication 1 TABLET(S): at 13:56

## 2020-10-19 RX ADMIN — DONEPEZIL HYDROCHLORIDE 10 MILLIGRAM(S): 10 TABLET, FILM COATED ORAL at 21:20

## 2020-10-19 RX ADMIN — Medication 1 PATCH: at 21:12

## 2020-10-19 RX ADMIN — MEMANTINE HYDROCHLORIDE 10 MILLIGRAM(S): 10 TABLET ORAL at 13:56

## 2020-10-19 RX ADMIN — Medication 100 MILLIEQUIVALENT(S): at 00:26

## 2020-10-19 RX ADMIN — Medication 650 MILLIGRAM(S): at 23:37

## 2020-10-19 RX ADMIN — Medication 100 MILLIGRAM(S): at 13:54

## 2020-10-19 RX ADMIN — Medication 650 MILLIGRAM(S): at 22:31

## 2020-10-19 RX ADMIN — SODIUM CHLORIDE 150 MILLILITER(S): 9 INJECTION, SOLUTION INTRAVENOUS at 02:13

## 2020-10-19 RX ADMIN — Medication 0.25 MILLIGRAM(S): at 09:58

## 2020-10-19 RX ADMIN — Medication 1 PATCH: at 13:54

## 2020-10-19 NOTE — PHYSICAL THERAPY INITIAL EVALUATION ADULT - LEVEL OF INDEPENDENCE: GAIT, REHAB EVAL
unable to perform/due to feeling unsteady - pt just returned from MRI in which patient states he had Ativan prior to test

## 2020-10-19 NOTE — BEHAVIORAL HEALTH ASSESSMENT NOTE - CASE SUMMARY
As above. Pt is showing worsening impulsivity, irritability, and disorganized behavior in context of progressing dementia and chronic psychiatric hx/backdrop; overall shows worsening impulsivity/judgment with poor insight. Unclear if meets criteria for inpatient psychiatric care, as much of that depends on family's level of comfort with him returning home (he has poor insight/minimizes everything so he is unreliable in determining this). We will await medical clearance and continue to follow him. Cannot leave AMA (lacks capacity given his poor insight/judgment).

## 2020-10-19 NOTE — H&P ADULT - PROBLEM SELECTOR PLAN 4
- Continue home medications - K 3.3  - s/p KCl in ED  - Monitor BMP w/ Mg and Phos  - Continue tele monitoring

## 2020-10-19 NOTE — PHARMACOTHERAPY INTERVENTION NOTE - COMMENTS
Medication history is complete. Medication list updated in Outpatient Medication Record (OMR). Please call spectra [phone] if you have any questions.

## 2020-10-19 NOTE — H&P ADULT - ATTENDING COMMENTS
Patient seen and examined, case discussed with MO Diamond. This is a 65M with history as above who presents to the hospital after an accidental overdose on seroquel. States that he had taken the medication due to insomnia, took 1 pill at 11PM on Saturday, another pill a little while later, and then 2 pills at once around 2 AM for persistent insomnia. After that he fell asleep (reports falling asleep on the floor, usually sleeps on the floor due to chronic back issues) and was found by his wife on the floor when then brought him to the hospital. Patient denies any SI/HI and denies any depression/anxiety. Said that this OD was unintentional and he will stop taking his seroquel. Wife reports that patient does seem to have issues taking his medications and sometimes forgets if he has taken medications or not. With regards to his EtOH use, the patient reports being sober for the past 3 weeks and his wife mostly corroborates this but states that she believes the patient might have had at least 1 beer recently when he went missing for ~26 hours from his substance abuse rehab center earlier this week (was taken to [facility] for eval after but he left AMA and went home. Wife also reports new onset of slurred speech for the patient about 6 weeks ago, said that it started suddenly and has persisted since. Examination currently shows patient to be at his baseline mental status, following instructions, calm and cooperative, and without any signs of acute withdrawals.   - With regards to his seroquel overdose, will c/w telemetry monitoring, will repeat EKG in AM, currently seems to have recovered from his sedation from the seroquel  - CIWA monitoring with prn ativan but low suspicion of acute EtOH withdrawal for patient  - Wife notes patient with slurred speech but on my eval no significant slurred speech noted, no other neurological deficits noted otherwise, CTH here negative and CTH at [facility] 3 days prior also without acute findings, unclear if CVA or related to dementia/korsakoffs, will check MRI Head  - Other management as above

## 2020-10-19 NOTE — BEHAVIORAL HEALTH ASSESSMENT NOTE - RISK ASSESSMENT
Low Acute Suicide Risk The patient has a low acute risk for suicide. Risk factors include: mood disorder, chronic eoth use d/o, dementia, social stressors, male gender, age, and access to firearms.  Protective factors include future orientation, identifies reasons for living, responsibility to self and family, social support, no  previous hx of psychiatric illnesses, and no previous hx of SA.

## 2020-10-19 NOTE — PROGRESS NOTE ADULT - PROBLEM SELECTOR PLAN 1
- Accidental overdose on seroquel, no SI/HI, no depression  - Monitor EKG for QTc prolongation. Current QTc 477.  - check EKG today.  - Patient currently stable on RA. Monitor respiratory status  - Monitor on tele - Accidental overdose on seroquel, no SI/HI, no depression  -EKG with QTc prolongation. QTc 477 on admission. Now Qtc 456  -symptoms resolved.  - Patient currently stable on RA. Monitor respiratory status  - Monitor on tele  -consult psychiatry since psych medication misuse  -If cleared by psych and MRI head negative for CVA can discharge home today.

## 2020-10-19 NOTE — H&P ADULT - PROBLEM SELECTOR PLAN 9
- Knee compression stockings - SCDs    #Diet  - Regular DASH diet    #Activity   - Ambulate with assistance, PT eval, Fall risk precautions  #Dispo  - Patient's wife requesting additional help at home vs possible NH placement for patient, LEE ANN zamarripa for dispo in AM

## 2020-10-19 NOTE — PROGRESS NOTE ADULT - PROBLEM SELECTOR PLAN 5
- Patient's wife reports patient with sudden onset of slurred speech about 6 weeks ago, currently on exam has ?mild slurred speech though patient states this is because his mouth is dry, no other neurological examination findings  - CTH here without acute findings, also had a CTH at [facility] on 10/15 that did not show any acute findings, did reveal some microvascular ischemic changes  - Currently neurologically intact, will monitor on neuro checks for now  - MRI head for further eval, if positive for CVA then would need neuro eval, risk factor modification - Patient's wife reports patient with sudden onset of slurred speech about 6 weeks ago, but currently on exam has no slurred speech and neurologic exam nonfocal.   - CTH here without acute findings, also had a CTH at [facility] on 10/15 that did not show any acute findings, did reveal some microvascular ischemic changes  - check MRI head r/o CVA

## 2020-10-19 NOTE — BEHAVIORAL HEALTH ASSESSMENT NOTE - NSBHCHARTREVIEWLAB_PSY_A_CORE FT
15.1   8.96  )-----------( 191      ( 19 Oct 2020 10:12 )             44.9     10-19    143  |  107  |  5<L>  ----------------------------<  89  4.1   |  23  |  0.68    Ca    9.4      19 Oct 2020 10:12    TPro  7.3  /  Alb  4.1  /  TBili  0.6  /  DBili  x   /  AST  62<H>  /  ALT  29  /  AlkPhos  74  10-19    Urinalysis Basic - ( 18 Oct 2020 15:07 )    Color: YELLOW / Appearance: Lt TURBID / S.014 / pH: 6.0  Gluc: NEGATIVE / Ketone: NEGATIVE  / Bili: NEGATIVE / Urobili: NORMAL   Blood: NEGATIVE / Protein: 30 / Nitrite: NEGATIVE   Leuk Esterase: NEGATIVE / RBC: 0-2 / WBC 0-2   Sq Epi: x / Non Sq Epi: OCC / Bacteria: FEW

## 2020-10-19 NOTE — BEHAVIORAL HEALTH ASSESSMENT NOTE - DETAILS
see HPI brother had schizophrenia has several shot guns at home, states they are locked up, wife/family awared of where guns are located "itchy" skin in the R forearm

## 2020-10-19 NOTE — PROGRESS NOTE ADULT - ASSESSMENT
65 y.o. male w/ Hx Etoh abuse c/b Korsakoff syndrome, HTN, vascular dementia p/w accidental drug overdose with Seroquel due to insomnia. 65 y.o. male w/ Hx Etoh abuse c/b Korsakoff syndrome, HTN, vascular dementia p/w accidental drug overdose with Seroquel due to insomnia. D/C 40 minutes.

## 2020-10-19 NOTE — H&P ADULT - PROBLEM SELECTOR PLAN 5
- c/w home medications  - Psych c/s called for dementia - c/w home medications  - Consider psych c/s in AM - c/w home medications - Patient's wife reports patient with sudden onset of slurred speech about 6 weeks ago, currently on exam has ?mild slurred speech though patient states this is because his mouth is dry, no other neurological examination findings  - CTH here without acute findings, also had a CTH at [facility] on 10/15 that did not show any acute findings, did reveal some microvascular ischemic changes  - Currently neurologically intact, will monitor on neuro checks for now  - MRI head for further eval, if positive for CVA then would need neuro eval, risk factor modification

## 2020-10-19 NOTE — H&P ADULT - ASSESSMENT
64 y/o male with PMH of Korsakoff syndrome 2/2 to alcohol abuse, HTN, and vascular dementia presented to ED d/t Seroquel overdose.

## 2020-10-19 NOTE — PROGRESS NOTE ADULT - PROBLEM SELECTOR PLAN 3
- Unknown time of last  drink.  - On symptom triggered CIWA  - Monitor for alcohol withdrawal sx's  - Monitor on tele  - Continue to monitor respiratory status - Unknown time of last  drink.  - On symptom triggered CIWA  - Monitor for alcohol withdrawal sx's  -CIWA 0

## 2020-10-19 NOTE — BEHAVIORAL HEALTH ASSESSMENT NOTE - NSBHCHARTREVIEWIMAGING_PSY_A_CORE FT
80 Ventricular Rate BPM  80 Atrial Rate BPM  112 P-R Interval ms  74 QRS Duration ms  382 Q-T Interval ms  440 QTC Calculation(Bazett) ms  55 P Axis degrees  53 R Axis degrees  40 T Axis degrees  Normal sinus rhythm  Normal ECG  Confirmed by Ezio TENORIO, Fercho ([id number]) on 12/1/2019 2:35:16 PM  
MR brain 10/19/2020:   IMPRESSION:  Motion limited exam.    No intracranial abnormality identified given study limitations.

## 2020-10-19 NOTE — BEHAVIORAL HEALTH ASSESSMENT NOTE - SUICIDE RISK FACTORS
Alcohol/Substance abuse disorders/Insomnia/Mood Disorder current/past/Impulsivity/Access to lethal methods (pills, firearm, etc.: Ask specifically about presence or absence of a firearm in the home or ease of accessing

## 2020-10-19 NOTE — H&P ADULT - HISTORY OF PRESENT ILLNESS
64 y/o male with PMH of Korsakoff syndrome 2/2 to alcohol abuse, HTN, and vascular dementia presented to ED d/t Seroquel overdose. Patient states he was having difficulty falling asleep and took one pill, 400 mg, of his  prescription medication from  and could not fall asleep. He then took another pill and still stated he was having insomnia Patient proceeded to take an additional two tablets and does not remember anything after until he woke up in [facility] ED. Patient states he has no interest in hurting himself or others and those ideas has "never crossed his mind." Patient states he has not had a drink in 2-3 weeks but according to chart review patient was seen at [place] ED 3 days ago for alcohol intoxication. He states previously he was drinking 2L of wine daily. He states he has started to smoke again about 1 pack per day. He previously had stopped for the last 20 years but while at rehab decided to smoke again. Patient with mild slurred speech during interview. CTH negative. Per Wife, patient has had the slurred speech for 6 weeks. Patient denies HA, fever, chills, dizziness, change in vision, confusion, chest pain, SOB, cough, CARRERA, abdominal pain, N/V/D/C, dysuria hematuria, BRBPR, melena, or hematochezia. 66 y/o male with PMH of Korsakoff syndrome 2/2 to alcohol abuse, HTN, and vascular dementia presented to ED d/t Seroquel overdose. Patient states he was having difficulty falling asleep and took one pill, 400 mg, of his  prescription medication from  and could not fall asleep. He then took another pill and still stated he was having insomnia Patient proceeded to take an additional two tablets and does not remember anything after until he woke up in [facility] ED. Patient states he has no interest in hurting himself or others and those ideas has "never crossed his mind." Patient states he has not had a drink in 2-3 weeks but according to chart review patient was seen at [place] ED 3 days ago for confusion, slurred speech, and patient missing for 26 hours. He states previously he was drinking 2L of wine daily. He states he has started to smoke again about 1 pack per day. Per Wife, she states he has been buying alcohol recently. He previously had stopped for the last 20 years but while at rehab decided to smoke again. Patient with mild slurred speech during interview. CTH negative. Per Wife, patient has had the slurred speech for 6 weeks. Patient denies HA, fever, chills, dizziness, change in vision, confusion, tremors, chest pain, SOB, cough, CARRERA, abdominal pain, N/V/D/C, dysuria hematuria, BRBPR, melena, or hematochezia. 66 y/o male with PMH of Korsakoff syndrome 2/2 to alcohol abuse, HTN, and vascular dementia presented to ED d/t Seroquel overdose. Patient states he was having difficulty falling asleep and took one pill, 400 mg, of his  prescription medication from  and could not fall asleep. He then took another pill and still stated he was still having insomnia. Patient proceeded to take an additional two tablets and does not remember anything after until he woke up in [facility] ED. Patient states he has no interest in hurting himself or others and those ideas has "never crossed his mind." Patient states he has not had a drink in 2-3 weeks but according to chart review patient was seen at [place] ED 3 days ago for confusion, slurred speech, and patient missing for 26 hours. He states previously he was drinking 2L of wine daily. He states he has started to smoke again about 1 pack per day. Per Wife, she states he has been buying alcohol recently. He previously had stopped for the last 20 years but while at rehab decided to smoke again. Patient with mild slurred speech during interview. CTH negative. Per Wife, patient has had the slurred speech for 6 weeks. Patient denies HA, fever, chills, dizziness, change in vision, confusion, tremors, chest pain, SOB, cough, CARRERA, abdominal pain, N/V/D/C, dysuria hematuria, BRBPR, melena, or hematochezia. 66 y/o male with PMH of Korsakoff syndrome 2/2 to alcohol abuse, HTN, and vascular dementia presented to ED d/t Seroquel overdose. Patient states he was having difficulty falling asleep and took one pill, 400 mg, of his  prescription medication from  and could not fall asleep. He then took another pill and still stated he was still having insomnia. Patient proceeded to take an additional two tablets and does not remember anything after until he woke up in [facility] ED. Patient states he has no interest in hurting himself or others and those ideas has "never crossed his mind." Patient states he has not had a drink in 2-3 weeks but according to chart review patient was seen at [place] ED 3 days ago for confusion, slurred speech, and patient missing for 26 hours. He states previously he was drinking 2L of wine daily. Per Wife, she states he has been buying alcohol recently. He states he has started to smoke again about 1 pack per day. He previously had stopped for the last 20 years but while at rehab decided to smoke again. Patient with mild slurred speech during interview. CTH negative. Per Wife, patient has had the slurred speech for 6 weeks. Patient denies HA, fever, chills, dizziness, change in vision, confusion, tremors, chest pain, SOB, cough, CARRERA, abdominal pain, N/V/D/C, dysuria hematuria, BRBPR, melena, or hematochezia. 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 Patient states he was having difficulty falling asleep and took one pill, 400 mg, of his  prescription medication from  and could not fall asleep. He then took another pill and still stated he was still having insomnia. Patient proceeded to take an additional two tablets and does not remember anything after until he woke up in [facility] ED. Patient states he has no interest in hurting himself or others and those ideas has "never crossed his mind." Patient states he has not had a drink in 2-3 weeks but according to chart review patient was seen at [place] ED 3 days ago for confusion, slurred speech, and patient missing for 26 hours. He states previously he was drinking 2L of wine daily. Per Wife, she states he has been buying alcohol recently. He states he has started to smoke again about 1 pack per day. He previously had stopped for the last 20 years but while at rehab decided to smoke again. Patient with mild slurred speech during interview. CTH negative. Per Wife, patient has had the slurred speech for 6 weeks that started acutely, patient states that its because he has a dry mouth, denies any slurred speech currently. Patient denies HA, fever, chills, dizziness, change in vision, confusion, tremors, chest pain, SOB, cough, CARRERA, abdominal pain, N/V/D/C, dysuria hematuria, BRBPR, melena, or hematochezia.    On arrival to the ED, his vitals were T 97.0, P 85, /90, RR 18, O2 sat 97% RA. His lab work showed mild anemia, hypokalemia, mildly suppressed TSH, and elevated CK. His tox screen was negative and his UA did not show any acute findings. His lactate was 1.3. He had a CTH that did not show any acute findings.

## 2020-10-19 NOTE — BEHAVIORAL HEALTH ASSESSMENT NOTE - DESCRIPTION (FIRST USE, LAST USE, QUANTITY, FREQUENCY, DURATION)
quit for >20yrs, recently started smoking again, is now smoking 1ppd started drinking at age 15yo, stopped for 4yrs in the 1980s, but since then has continued to drink about 1bottle of wine per day

## 2020-10-19 NOTE — BEHAVIORAL HEALTH ASSESSMENT NOTE - SUMMARY
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     Pt partially cooperative on exam. Pt repeatedly insists that he took Seroquel pills 2/2 to insomnia and denies any suicidal intent. However, pt does endorse poor sleep, irritability, distractibility, and feelings of being "amped up" with relapsed in smoking, ongoing drinking, and recently went missing for about a day. In addition to dementia symptoms, given pt's hx of Bipolar disorder, he is likely experiencing hypomanic symptoms 2/2 to medication non-compliance. His level of disorientation and confusion likley multifactorial to the dementia and etoh use. 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     Pt partially cooperative on exam. Pt repeatedly insists that he took Seroquel pills 2/2 to insomnia and denies any suicidal intent. However, pt does endorse poor sleep, irritability, distractibility, and feelings of being "amped up" with relapsed in smoking, ongoing drinking, and recently went missing for about a day. In addition to dementia symptoms, given pt's hx of Bipolar disorder, he is likely experiencing hypomanic symptoms 2/2 to medication non-compliance. His level of disorientation and confusion likely multifactorial to the dementia, Korsakov, and etoh use. No current concern for SI and family feels safe with pt returning home, denied any issues regarding hx of SI/HI.

## 2020-10-19 NOTE — H&P ADULT - PROBLEM SELECTOR PLAN 6
- Continue home medications  - BP currently stable. Continue to monitor q4h - Slightly suppressed TSH, will check T4 and T3 total in AM for further eval

## 2020-10-19 NOTE — H&P ADULT - PROBLEM SELECTOR PLAN 7
- Heparin SQ q12h for ppx - Knee compression stockings - c/w home medications (donepezil, memantine) - c/w home medications as per OMR  - Med Hx Pharmacist in AM to help verify patient's medications

## 2020-10-19 NOTE — H&P ADULT - PROBLEM SELECTOR PLAN 10
1.  Name of PCP: Unknown  2.  PCP Contacted on Admission: [ ] Y    [x] N - admitted at night    3.  PCP contacted at Discharge: [ ] Y    [ ] N    [ ] N/A  4.  Post-Discharge Appointment Date and Location:  5.  Summary of Handoff given to PCP:

## 2020-10-19 NOTE — BEHAVIORAL HEALTH ASSESSMENT NOTE - ACTIVATING EVENTS/STRESSORS
Non-compliant or not receiving treatment/Substance intoxication or withdrawal/Triggering events leading to humiliation, shame, and/or despair (e.g. Loss of relationship, financial or health status) (real or anticipated)

## 2020-10-19 NOTE — H&P ADULT - NSHPSOCIALHISTORY_GEN_ALL_CORE
Alcohol: Daily, 2L wine per day. According to patient last drink was 2-3 weeks ago. Per chart review patient was in [facility] ED 3 days ago for alcohol intoxication  Smokin pack per day > 20 years  Drugs: Patient denies drug use  Marital Status:  Alcohol: Daily, 2L wine per day. According to patient last drink was 2-3 weeks ago.   Smokin pack per day > 20 years  Drugs: Patient denies drug use  Marital Status:  Alcohol: Daily, 2L wine per day. According to patient last drink was 2-3 weeks ago.   Smokin pack per day > 20 years  Drugs: Patient denies drug use  Marital Status: , lives with wife

## 2020-10-19 NOTE — H&P ADULT - PROBLEM SELECTOR PLAN 8
1.  Name of PCP:   2.  PCP Contacted on Admission: [ ] Y    [x] N - admitted at night    3.  PCP contacted at Discharge: [ ] Y    [ ] N    [ ] N/A  4.  Post-Discharge Appointment Date and Location:  5.  Summary of Handoff given to PCP: - Continue home medications  - BP currently stable. Continue to monitor q4h - Continue home medications as per OMR  - BP currently stable. Continue to monitor q4h  - Med Hx Pharmacist in AM to help verify patient's medications

## 2020-10-19 NOTE — H&P ADULT - PROBLEM SELECTOR PLAN 1
- Monitor EKG for QTc prolongation  - Repeat EKG in AM  - Patient currently stable on RA. Monitor respiratory status - Monitor EKG for QTc prolongation. Current QTc 477.  - Repeat EKG in AM  - Patient currently stable on RA. Monitor respiratory status  - Monitor on tele - Monitor EKG for QTc prolongation. Current QTc 477.  - Repeat EKG in AM  - Patient currently stable on RA. Monitor respiratory status  - Consider psych c/s in AM  - Monitor on tele - Accidental overdose on seroquel, no SI/HI, no depression  - Monitor EKG for QTc prolongation. Current QTc 477.  - Repeat EKG in AM  - Patient currently stable on RA. Monitor respiratory status  - Monitor on tele

## 2020-10-19 NOTE — CHART NOTE - NSCHARTNOTEFT_GEN_A_CORE
Notified by RN that has rectal temp 100.9.          A&P:  - Tylenol for fever  - CBC, CMP, BCx, Free T4, LDH, VBG, lactate   - CXR  - Repeat EKG  - Psych c/s for AM unless new or worsening sxs. Notified by RN that has rectal temp 101.9. Patient admitted yesterday for reported unintentional Seroquel overdose. Patient denies feeling muscle rigidity, diaphoresis, chest pain, SOB, N/V/D, cough, fever, chills.     T(F): 101.9   HR: 105  BP: 157/98  RR: 18   SpO2: 100% RA    Constitutional: NAD, well-developed, well-nourished  Respiratory: Clear to auscultation bilaterally. No wheezes, rales or rhonchi. 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 Now with fever.  - Infectious work-up to be done.  - Will monitor for possible Neuroleptic Malignant Syndrome, as new onset fever s/p high doses of Seroquel should be considered; however, patient does not clinically appear to have at this time. Will watch for Muscle rigidity, fever, tachycardia, AMS. Pt heart rate slightly elevated at 105- but likely 2/2 fever.  - Tylenol for fever  - CBC, CMP, BCx, Free T4, LDH, VBG, lactate, repeat CK  - CXR  - Repeat EKG  - Will continue to monitor.  - Hospitalist made aware.

## 2020-10-19 NOTE — H&P ADULT - PROBLEM SELECTOR PLAN 3
-  --> 482. Continue to trend  - On IVF  - UA negative   - Repeat CK in AM - Patient states that he fell asleep on the basement floor after taking the seroquel pills and his wife corroborates this, likely has mild rhabdo 2/2 sleeping on the floor for a prolonged period of time  -  --> 482. Continue to trend  - On IVF  - UA negative   - Repeat CK in AM

## 2020-10-19 NOTE — H&P ADULT - NSHPPHYSICALEXAM_GEN_ALL_CORE
PHYSICAL EXAM:  GENERAL: No acute distress, well-developed, lying in bed sleeping  HEAD:  Atraumatic, Normocephalic  EYES: EOMI, PERRLA, conjunctiva and sclera clear  NECK: Supple, no lymphadenopathy, no JVD  CHEST/LUNG: CTAB; No wheezes, rales, or rhonchi  HEART: RRR; No murmurs, rubs, or gallops  ABDOMEN: Soft, non-tender, non-distended; normal bowel sounds, no organomegaly  EXTREMITIES:  2+ peripheral pulses b/l, No clubbing, cyanosis, or edema  NEUROLOGY: AAO x 4, no focal deficits. Patient with mild slurred speech, easily understandable during interview.   SKIN: No rashes or lesions PHYSICAL EXAM:  GENERAL: No acute distress, well-developed, lying in bed sleeping  EYES: EOMI, PERRL, conjunctiva and sclera clear  NECK: Supple, no lymphadenopathy, no JVD  CHEST/LUNG: CTAB; No wheezes, rales, or rhonchi  HEART: RRR; No murmurs, rubs, or gallops  BACK: No spinal tenderness, no flank pain  ABDOMEN: Soft, non-tender, non-distended; normal bowel sounds, no organomegaly  EXTREMITIES:  2+ peripheral pulses b/l, No clubbing, cyanosis, or edema  NEUROLOGY: AAO x 4, no focal deficits. Patient with ?mild slurred speech, easily understandable during interview.   SKIN: No rashes or lesions

## 2020-10-19 NOTE — BEHAVIORAL HEALTH ASSESSMENT NOTE - NSBHCHARTREVIEWVS_PSY_A_CORE FT
Vital Signs Last 24 Hrs  T(C): 36.7 (19 Oct 2020 13:30), Max: 36.7 (19 Oct 2020 13:30)  T(F): 98 (19 Oct 2020 13:30), Max: 98 (19 Oct 2020 13:30)  HR: 80 (19 Oct 2020 13:30) (74 - 87)  BP: 151/74 (19 Oct 2020 13:30) (141/74 - 166/91)  BP(mean): --  RR: 18 (19 Oct 2020 13:30) (18 - 18)  SpO2: 98% (19 Oct 2020 13:30) (96% - 100%)

## 2020-10-19 NOTE — PROGRESS NOTE ADULT - PROBLEM SELECTOR PLAN 2
- Patient states that he fell asleep on the basement floor after taking the seroquel pills and his wife corroborates this, likely has mild rhabdo 2/2 sleeping on the floor for a prolonged period of time  -  --> 482. Continue to trend  - On IVF  - UA negative   - monitoring CPK

## 2020-10-19 NOTE — H&P ADULT - NSHPLABSRESULTS_GEN_ALL_CORE
Labs:                        12.9   5.97  )-----------( 158      ( 18 Oct 2020 15:07 )             38.2     10-18    146<H>  |  110<H>  |  8   ----------------------------<  86  3.3<L>   |  26  |  0.74    Ca    8.5      18 Oct 2020 22:10    TPro  6.6  /  Alb  3.9  /  TBili  0.7  /  DBili  x   /  AST  82<H>  /  ALT  29  /  AlkPhos  61  10-18    LFTs: Alk Phos: 61 u/L / AST: 82 u/L<H> / ALT: 29 u/L / Albumin 3.9 g/dL / Bilirubin: 0.7 mg/dL  10-19-20 @ 03:39    Coagulation Factors:  Prothrombin Time, Plasma: 12.3 SEC [10.6 - 13.6] (10-18-20 @ 15:07)  INR: 1.07 [0.88 - 1.16] (10-18-20 @ 15:07)  Activated Partial Thromboplastin Time: 32.2 SEC [27.0 - 36.3] (10-18-20 @ 15:07)  Prothrombin Time, Plasma: 13.3 sec [10.6 - 13.6] (10-15-20 @ 22:42)  INR: 1.15 ratio [0.88 - 1.16] (10-15-20 @ 22:42)  Activated Partial Thromboplastin Time: 30.7 sec [27.5 - 35.5] (10-15-20 @ 22:42)      Cardiac Enzymes:     Creatine Kinase: 482 u/L<H> [30 - 200]  10-18-20 @ 22:10  Creatine Kinase: 847 u/L<H> [30 - 200]  10-18-20 @ 15:07       Blood Gas Venous: pH: 7.33 | HCO3: 25 | pCO2: 55 | pO2: 28 | Lactate: 1.3    10-19-20 @ 03:39    Urinalysis Basic: 10-19-20 @ 03:39  Color: YELLOW / Appearance: Lt TURBID / S.014 / pH: 6.0  Gluc: NEGATIVE / Ketone: NEGATIVE / Bili: NEGATIVE / Urobili: NORMAL  Blood: NEGATIVE / Protein: 30 / Nitrite: NEGATIVE  Leuk Esterase: NEGATIVE / RBC: 0-2 / WBC: 0-2  Sq Epi: -- / Non Sq Epi: -- / Bacteria: FEW        Capillary Blood Glucose:  102 10-18-20 @ 17:55      Covid-19 Results:   NotDetec 10-18-20 @ 20:14  NotDetec 10-15-20 @ 22:42      EKG: NSR at 83. QTc 477    CT Head: IMPRESSION: No evidence of acute hemorrhage mass or mass effect in the posterior fossa or supratentorial region. Labs:                        12.9   5.97  )-----------( 158      ( 18 Oct 2020 15:07 )             38.2     10-18    146<H>  |  110<H>  |  8   ----------------------------<  86  3.3<L>   |  26  |  0.74    Ca    8.5      18 Oct 2020 22:10    TPro  6.6  /  Alb  3.9  /  TBili  0.7  /  DBili  x   /  AST  82<H>  /  ALT  29  /  AlkPhos  61  10-18    LFTs: Alk Phos: 61 u/L / AST: 82 u/L<H> / ALT: 29 u/L / Albumin 3.9 g/dL / Bilirubin: 0.7 mg/dL  10-19-20 @ 03:39    Coagulation Factors:  Prothrombin Time, Plasma: 12.3 SEC [10.6 - 13.6] (10-18-20 @ 15:07)  INR: 1.07 [0.88 - 1.16] (10-18-20 @ 15:07)  Activated Partial Thromboplastin Time: 32.2 SEC [27.0 - 36.3] (10-18-20 @ 15:07)  Prothrombin Time, Plasma: 13.3 sec [10.6 - 13.6] (10-15-20 @ 22:42)  INR: 1.15 ratio [0.88 - 1.16] (10-15-20 @ 22:42)  Activated Partial Thromboplastin Time: 30.7 sec [27.5 - 35.5] (10-15-20 @ 22:42)      Cardiac Enzymes:   Creatine Kinase: 482 u/L<H> [30 - 200]  10-18-20 @ 22:10  Creatine Kinase: 847 u/L<H> [30 - 200]  10-18-20 @ 15:07     Blood Gas Venous: pH: 7.33 | HCO3: 25 | pCO2: 55 | pO2: 28 | Lactate: 1.3    10-19-20 @ 03:39    Urinalysis Basic: 10-19-20 @ 03:39  Color: YELLOW / Appearance: Lt TURBID / S.014 / pH: 6.0  Gluc: NEGATIVE / Ketone: NEGATIVE / Bili: NEGATIVE / Urobili: NORMAL  Blood: NEGATIVE / Protein: 30 / Nitrite: NEGATIVE  Leuk Esterase: NEGATIVE / RBC: 0-2 / WBC: 0-2  Sq Epi: -- / Non Sq Epi: -- / Bacteria: FEW        Capillary Blood Glucose:  102 10-18-20 @ 17:55      Covid-19 Results:   NotDetec 10-18-20 @ 20:14  NotDetec 10-15-20 @ 22:42      EKG: NSR at 83. QTc 477. No significant ST-T wave changes.    CT Head: IMPRESSION: No evidence of acute hemorrhage mass or mass effect in the posterior fossa or supratentorial region.

## 2020-10-19 NOTE — PROGRESS NOTE ADULT - SUBJECTIVE AND OBJECTIVE BOX
Patient is a 65y old  Male who presents with a chief complaint of Overdose (19 Oct 2020 03:11)      SUBJECTIVE / OVERNIGHT EVENTS:    MEDICATIONS  (STANDING):  amLODIPine   Tablet 10 milliGRAM(s) Oral daily  donepezil 10 milliGRAM(s) Oral at bedtime  folic acid 1 milliGRAM(s) Oral daily  memantine 10 milliGRAM(s) Oral daily  multivitamin 1 Tablet(s) Oral daily  nicotine - 21 mG/24Hr(s) Patch 1 patch Transdermal daily  omega-3-Acid Ethyl Esters 2 Gram(s) Oral two times a day  sodium chloride 0.9% 1000 milliLiter(s) (150 mL/Hr) IV Continuous <Continuous>  thiamine 100 milliGRAM(s) Oral daily    MEDICATIONS  (PRN):  LORazepam     Tablet 2 milliGRAM(s) Oral every 2 hours PRN Symptom-triggered 2 point increase in CIWA-Ar  LORazepam     Tablet 2 milliGRAM(s) Oral every 1 hour PRN Symptom-triggered: each CIWA -Ar score 8 or GREATER  melatonin 3 milliGRAM(s) Oral at bedtime PRN Insomnia      Vital Signs Last 24 Hrs  T(C): 36.6 (19 Oct 2020 09:00), Max: 36.6 (19 Oct 2020 07:04)  T(F): 97.8 (19 Oct 2020 09:00), Max: 97.8 (19 Oct 2020 07:04)  HR: 87 (19 Oct 2020 09:00) (74 - 88)  BP: 141/74 (19 Oct 2020 09:00) (141/74 - 166/91)  BP(mean): --  RR: 18 (19 Oct 2020 09:00) (18 - 18)  SpO2: 99% (19 Oct 2020 09:00) (96% - 100%)  CAPILLARY BLOOD GLUCOSE      POCT Blood Glucose.: 102 mg/dL (18 Oct 2020 17:55)    I&O's Summary      PHYSICAL EXAM:  GENERAL: NAD, well-developed  HEAD:  Atraumatic, Normocephalic  EYES: EOMI, PERRLA, conjunctiva and sclera clear  NECK: Supple, No JVD  CHEST/LUNG: Clear to auscultation bilaterally; No wheeze  HEART: Regular rate and rhythm; No murmurs, rubs, or gallops  ABDOMEN: Soft, Nontender, Nondistended; Bowel sounds present  EXTREMITIES:  2+ Peripheral Pulses, No clubbing, cyanosis, or edema  PSYCH: AAOx3  NEUROLOGY: non-focal  SKIN: No rashes or lesions    LABS:                        12.9   5.97  )-----------( 158      ( 18 Oct 2020 15:07 )             38.2     10-18    146<H>  |  110<H>  |  8   ----------------------------<  86  3.3<L>   |  26  |  0.74    Ca    8.5      18 Oct 2020 22:10    TPro  6.6  /  Alb  3.9  /  TBili  0.7  /  DBili  x   /  AST  82<H>  /  ALT  29  /  AlkPhos  61  10-18    PT/INR - ( 18 Oct 2020 15:07 )   PT: 12.3 SEC;   INR: 1.07          PTT - ( 18 Oct 2020 15:07 )  PTT:32.2 SEC  CARDIAC MARKERS ( 18 Oct 2020 22:10 )  x     / x     / 482 u/L / x     / x      CARDIAC MARKERS ( 18 Oct 2020 15:07 )  x     / x     / 847 u/L / x     / x          Urinalysis Basic - ( 18 Oct 2020 15:07 )    Color: YELLOW / Appearance: Lt TURBID / S.014 / pH: 6.0  Gluc: NEGATIVE / Ketone: NEGATIVE  / Bili: NEGATIVE / Urobili: NORMAL   Blood: NEGATIVE / Protein: 30 / Nitrite: NEGATIVE   Leuk Esterase: NEGATIVE / RBC: 0-2 / WBC 0-2   Sq Epi: x / Non Sq Epi: OCC / Bacteria: FEW        RADIOLOGY & ADDITIONAL TESTS:    Imaging Personally Reviewed:    Consultant(s) Notes Reviewed:      Care Discussed with Consultants/Other Providers:   Patient is a 65y old  Male who presents with a chief complaint of Overdose (19 Oct 2020 03:11)      SUBJECTIVE / OVERNIGHT EVENTS:  Patient had panic attack at MRI due to claustrophobia so will get an additional ativan 2mg IVP x 1. Patient denies cp, SOB, abdominal pain, N/V/D     MEDICATIONS  (STANDING):  amLODIPine   Tablet 10 milliGRAM(s) Oral daily  donepezil 10 milliGRAM(s) Oral at bedtime  folic acid 1 milliGRAM(s) Oral daily  memantine 10 milliGRAM(s) Oral daily  multivitamin 1 Tablet(s) Oral daily  nicotine - 21 mG/24Hr(s) Patch 1 patch Transdermal daily  omega-3-Acid Ethyl Esters 2 Gram(s) Oral two times a day  sodium chloride 0.9% 1000 milliLiter(s) (150 mL/Hr) IV Continuous <Continuous>  thiamine 100 milliGRAM(s) Oral daily    MEDICATIONS  (PRN):  LORazepam     Tablet 2 milliGRAM(s) Oral every 2 hours PRN Symptom-triggered 2 point increase in CIWA-Ar  LORazepam     Tablet 2 milliGRAM(s) Oral every 1 hour PRN Symptom-triggered: each CIWA -Ar score 8 or GREATER  melatonin 3 milliGRAM(s) Oral at bedtime PRN Insomnia      Vital Signs Last 24 Hrs  T(C): 36.6 (19 Oct 2020 09:00), Max: 36.6 (19 Oct 2020 07:04)  T(F): 97.8 (19 Oct 2020 09:00), Max: 97.8 (19 Oct 2020 07:04)  HR: 87 (19 Oct 2020 09:00) (74 - 88)  BP: 141/74 (19 Oct 2020 09:00) (141/74 - 166/91)  BP(mean): --  RR: 18 (19 Oct 2020 09:00) (18 - 18)  SpO2: 99% (19 Oct 2020 09:00) (96% - 100%)  CAPILLARY BLOOD GLUCOSE      POCT Blood Glucose.: 102 mg/dL (18 Oct 2020 17:55)    I&O's Summary      PHYSICAL EXAM:  GENERAL: NAD, well-developed  HEAD:  Atraumatic, Normocephalic  EYES: EOMI, PERRLA, conjunctiva and sclera clear  NECK: Supple, No JVD  CHEST/LUNG: Clear to auscultation bilaterally; No wheeze  HEART: Regular rate and rhythm; No murmurs, rubs, or gallops  ABDOMEN: Soft, Nontender, Nondistended; Bowel sounds present  EXTREMITIES:  2+ Peripheral Pulses, No clubbing, cyanosis, or edema  PSYCH: AAOx3  NEUROLOGY: non-focal  SKIN: No rashes or lesions    LABS:                        12.9   5.97  )-----------( 158      ( 18 Oct 2020 15:07 )             38.2     10-18    146<H>  |  110<H>  |  8   ----------------------------<  86  3.3<L>   |  26  |  0.74    Ca    8.5      18 Oct 2020 22:10    TPro  6.6  /  Alb  3.9  /  TBili  0.7  /  DBili  x   /  AST  82<H>  /  ALT  29  /  AlkPhos  61  10-18    PT/INR - ( 18 Oct 2020 15:07 )   PT: 12.3 SEC;   INR: 1.07          PTT - ( 18 Oct 2020 15:07 )  PTT:32.2 SEC  CARDIAC MARKERS ( 18 Oct 2020 22:10 )  x     / x     / 482 u/L / x     / x      CARDIAC MARKERS ( 18 Oct 2020 15:07 )  x     / x     / 847 u/L / x     / x          Urinalysis Basic - ( 18 Oct 2020 15:07 )    Color: YELLOW / Appearance: Lt TURBID / S.014 / pH: 6.0  Gluc: NEGATIVE / Ketone: NEGATIVE  / Bili: NEGATIVE / Urobili: NORMAL   Blood: NEGATIVE / Protein: 30 / Nitrite: NEGATIVE   Leuk Esterase: NEGATIVE / RBC: 0-2 / WBC 0-2   Sq Epi: x / Non Sq Epi: OCC / Bacteria: FEW        RADIOLOGY & ADDITIONAL TESTS:    Imaging Personally Reviewed:    Consultant(s) Notes Reviewed:      Care Discussed with Consultants/Other Providers:

## 2020-10-19 NOTE — H&P ADULT - NSHPREVIEWOFSYSTEMS_GEN_ALL_CORE
REVIEW OF SYSTEMS:  Constitutional Symptoms: No weakness, fevers, chills, recent falls  Eyes: No visual changes, headache, eye pain, double vision  Ears, Nose, Mouth, Throat: No runny nose, sinus pain, ear pain, tinnitus, sore throat, dysphagia, odynophagia  Cardiovascular: No chest pain, palpitations, edema  Respiratory: No cough, wheezing, hemoptysis, shortness of breath  Gastrointestinal: No abdominal pain, dysphagia, anorexia, N/V/D/C, hematemesis, BRBPR, melena  Genitourinary: No dysuria, frequency, hematuria  Musculoskeletal: No joint pain, joint swelling, decreased ROM or strength  Skin: No pruritus, rashes, lesions, wounds  Neurologic:  No seizures, headache, paraesthesia, numbness, limb weakness  Psychiatric: No depression, anxiety, difficulty concentrating, anhedonia, lack of energy, SI/HI  Endocrine: No heat/cold intolerance, mood swings, sweats, polydipsia, polyuria  Hematologic/lymphatic: No purpura, petechia, prolonged or excessive bleeding after dental extraction / injury, use of anticoagulant and antiplatelet drugs (including aspirin)  Allergic/Immunologic: No anaphylaxis, allergic response to materials, foods, animals    Positives and pertinent negatives noted and all other systems negative. REVIEW OF SYSTEMS:  Constitutional Symptoms: No weakness, fevers, chills, recent falls  Eyes: No visual changes, headache, eye pain, double vision  Ears, Nose, Mouth, Throat: No runny nose, sinus pain, ear pain, tinnitus, sore throat, dysphagia, odynophagia  Cardiovascular: No chest pain, palpitations, edema  Respiratory: No cough, wheezing, hemoptysis, shortness of breath  Gastrointestinal: No abdominal pain, dysphagia, anorexia, N/V/D/C, hematemesis, BRBPR, melena  Genitourinary: No dysuria, frequency, hematuria  Musculoskeletal: No joint pain, joint swelling, decreased ROM or strength  Skin: No pruritus, rashes, lesions, wounds  Neurologic:  No seizures, headache, paraesthesia, numbness, limb weakness  Psychiatric: No depression, anxiety, difficulty concentrating, anhedonia, lack of energy, SI/HI  Endocrine: No heat/cold intolerance, mood swings, sweats, polydipsia, polyuria  Hematologic/lymphatic: No purpura, petechia, prolonged or excessive bleeding after dental extraction / injury, use of anticoagulant and antiplatelet drugs (including aspirin)  Allergic/Immunologic: No anaphylaxis, allergic response to materials, foods, animals

## 2020-10-19 NOTE — PROGRESS NOTE ADULT - PROBLEM SELECTOR PLAN 8
- Continue home medications as per OMR  - BP currently stable. Continue to monitor q4h  - Med Hx Pharmacist in AM to help verify patient's medications

## 2020-10-19 NOTE — H&P ADULT - PROBLEM SELECTOR PLAN 2
- Symptom triggered CIWA - Unsure when patient's last drink was  - On symptom triggered CIWA  - Monitor for alcohol withdrawal sx's  - Monitor on tele  - Continue to monitor respiratory status

## 2020-10-19 NOTE — BEHAVIORAL HEALTH ASSESSMENT NOTE - HPI (INCLUDE ILLNESS QUALITY, SEVERITY, DURATION, TIMING, CONTEXT, MODIFYING FACTORS, ASSOCIATED SIGNS AND SYMPTOMS)
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     On exam, pt states that he had difficulty falling asleep 2/2 to feeling "amped-up." Notes he took one pill of 800mg of Seroquel, to not effect. About an hr later, he took a second pill, again to no effect. He then decided to take 2 pills, hoping this would help. Next thing he remembers, he woke up in the ED. Pt unclear as to what events transpired b/w taking the pills and ED room. States he was desperate for sleep and figured Seroquel would be strong enough to help him go to bed. Denies he had any suicidal intention when taking pills. Endorses history of passive SI, but denies any intent or plan. Admits to having several shot guns at home, which  he inherited from his father (father passed away 2 yrs ago). Reports he doesn't use the shot guns, but if he wanted to hurt himself, he would use them because that would be "most effective."     Pt reports he usually sleeps about 7-8hrs per night. States issues with sleep for years, with worsening insomnia is the last 2 weeks. Also reports he has been "amped up" 2/2 to concerns that he might lose his job and recent fight with wife. Notes he has missed work intermittently this year. Also endorses spending hours on the computer, keeping himself busy as he cannot sleep. Notes he likes to shop on [employer], but his wife changed the account password, so he can't shop anymore. Also states he recently went missing from home for about a day, but unsure where he spend his time. Remembers riding buses and a cab to get home, which he would usually not do. Additionally, after quitting smoking for more than 20yrs, he started smoking a few months back. He is now smoking about 1ppd. He started drinking at age 15yo, stopped for about 4yrs in the late 1980s, but has continued to drink. Notes  he usually drinks about 1 bottle of wine per day. States his last drink was "a few weeks ago." Denies any current or past hx of illicit drug use. Does endorse he went to an EtOH rehab in [facility] in early 2020 and a few months back went to another rehab center in [place]. Notes neither place helped much.     Pt reports his mood today is "dark" as he wants to go home already. At home, notes he felt "pretty good." In addition to poor sleep, also endorses decreased appetite and concentration. He likes to read books and is currently reading a book at home which makes him happy. Denies any feelings of guilt, psychomotor retardation/agitation, or hopelessness. Denies any current SIIP/HIIP. Denies any AVH, paranoia, or IOR. Denies any manic symptoms and can't recall when he last had manic episode. Per pt, he used to see a psychiatrist and take meds (Buspar and Trileptal), but stopped seeing his psychiatrist and taking meds about a year. "I feel fine, I don't need the medication."      Per collateral from wife, ????? 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     On exam, pt states that he had difficulty falling asleep 2/2 to feeling "amped-up." Notes he took one pill of 800mg of Seroquel, to not effect. About an hr later, he took a second pill, again to no effect. He then decided to take 2 pills, hoping this would help. Next thing he remembers, he woke up in the ED. Pt unclear as to what events transpired b/w taking the pills and ED room. States he was desperate for sleep and figured Seroquel would be strong enough to help him go to bed. Denies he had any suicidal intention when taking pills. Endorses history of passive SI, but denies any intent or plan. Admits to having several shot guns at home, which  he inherited from his father (father passed away 2 yrs ago). Reports he doesn't use the shot guns, but if he wanted to hurt himself, he would use them because that would be "most effective."     Pt reports he usually sleeps about 7-8hrs per night. States issues with sleep for years, with worsening insomnia is the last 2 weeks. Also reports he has been "amped up" 2/2 to concerns that he might lose his job and recent fight with wife. Notes he has missed work intermittently this year. Also endorses spending hours on the computer, keeping himself busy as he cannot sleep. Notes he likes to shop on [employer], but his wife changed the account password, so he can't shop anymore. Also states he recently went missing from home for about a day, but unsure where he spend his time. Remembers riding buses and a cab to get home, which he would usually not do. Additionally, after quitting smoking for more than 20yrs, he started smoking a few months back. He is now smoking about 1ppd. He started drinking at age 17yo, stopped for about 4yrs in the late 1980s, but has continued to drink. Notes  he usually drinks about 1 bottle of wine per day. States his last drink was "a few weeks ago." Denies any current or past hx of illicit drug use. Does endorse he went to an EtOH rehab in [facility] in early 2020 and a few months back went to another rehab center in [place]. Notes neither place helped much.     Pt reports his mood today is "dark" as he wants to go home already. At home, notes he felt "pretty good." In addition to poor sleep, also endorses decreased appetite and concentration. He likes to read books and is currently reading a book at home which makes him happy. Denies any feelings of guilt, psychomotor retardation/agitation, or hopelessness. Denies any current SIIP/HIIP. Denies any AVH, paranoia, or IOR. Denies any manic symptoms and can't recall when he last had manic episode. Per pt, he used to see a psychiatrist and take meds (Buspar and Trileptal), but stopped seeing his psychiatrist and taking meds about a year. "I feel fine, I don't need the medication."      Per collateral from wife, Ms. Sarah Lemons ([phone]), has seen Dr. Jean Gandhi for mor than 10yrs, diagnosed with anxiety, OCD, depression. etc. Notes she helped pt "titrate off" all psych meds about 4months ago "to see what his baseline is." About 6-8 weeks ago, notes pt has been getting increasingly irritable and impulsive (ordered his own credit when wife took away all his cards), disappeared from home for a day after he left the house to buy cigarettes b/c he was aimlessly driving from town to town.  States pt was diagnosed with Korsakoff in Nov 2019.  She does not believe pt was trying to OD with seroquel. Denies he has ever had any suicidal behavior. Wife aware of fire arms at home, denies any concern pt would use them for self harm/to harm others. 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     On exam, pt states that he had difficulty falling asleep 2/2 to feeling "amped-up." Notes he took one pill of 800mg of Seroquel, to not effect. About an hr later, he took a second pill, again to no effect. He then decided to take 2 pills, hoping this would help. Next thing he remembers, he woke up in the ED. Pt unclear as to what events transpired b/w taking the pills and ED room. States he was desperate for sleep and figured Seroquel would be strong enough to help him go to bed. Denies he had any suicidal intention when taking pills. Endorses history of passive SI, but denies any intent or plan. Admits to having several shot guns at home, which  he inherited from his father (father passed away 2 yrs ago). Reports he doesn't use the shot guns, but if he wanted to hurt himself, he would use them because that would be "most effective."     Pt reports he usually sleeps about 7-8hrs per night. States issues with sleep for years, with worsening insomnia is the last 2 weeks. Also reports he has been "amped up" 2/2 to concerns that he might lose his job and recent fight with wife. Notes he has missed work intermittently this year. Also endorses spending hours on the computer, keeping himself busy as he cannot sleep. Notes he likes to shop on [employer], but his wife changed the account password, so he can't shop anymore. Also states he recently went missing from home for about a day, but unsure where he spend his time. Remembers riding buses and a cab to get home, which he would usually not do. Additionally, after quitting smoking for more than 20yrs, he started smoking a few months back. He is now smoking about 1ppd. He started drinking at age 17yo, stopped for about 4yrs in the late 1980s, but has continued to drink. Notes  he usually drinks about 1 bottle of wine per day. States his last drink was "a few weeks ago." Denies any current or past hx of illicit drug use. Does endorse he went to an EtOH rehab in [facility] in early 2020 and a few months back went to another rehab center in [place]. Notes neither place helped much.     Pt reports his mood today is "dark" as he wants to go home already. At home, notes he felt "pretty good." In addition to poor sleep, also endorses decreased appetite and concentration. He likes to read books and is currently reading a book at home which makes him happy. Denies any feelings of guilt, psychomotor retardation/agitation, or hopelessness. Denies any current SIIP/HIIP. Denies any AVH, paranoia, or IOR. Denies any manic symptoms and can't recall when he last had manic episode. Per pt, he used to see a psychiatrist and take meds (Buspar and Trileptal), but stopped seeing his psychiatrist and taking meds about a year. "I feel fine, I don't need the medication."      Per collateral from wife, Ms. Sarah Lemons ([phone]), has seen Dr. Jean Gandhi for more than 10yrs, diagnosed with anxiety, OCD, depression. etc. Notes she helped pt "titrate off" all psych meds about 4months ago "to see what his baseline is." About 6-8 weeks ago, notes pt has been getting increasingly irritable and impulsive (ordered his own credit when wife took away all his cards), disappeared from home for a day after he left the house to buy cigarettes b/c he was aimlessly driving from town to town.  States pt was diagnosed with Korsakoff in Nov 2019.  She does not believe pt was trying to OD with seroquel. Denies he has ever had any suicidal behavior. Wife aware of fire arms at home, denies any concern pt would use them for self harm/to harm others.

## 2020-10-20 ENCOUNTER — TRANSCRIPTION ENCOUNTER (OUTPATIENT)
Age: 65
End: 2020-10-20

## 2020-10-20 VITALS
DIASTOLIC BLOOD PRESSURE: 82 MMHG | SYSTOLIC BLOOD PRESSURE: 147 MMHG | RESPIRATION RATE: 18 BRPM | HEART RATE: 82 BPM | TEMPERATURE: 98 F | OXYGEN SATURATION: 100 %

## 2020-10-20 DIAGNOSIS — R50.9 FEVER, UNSPECIFIED: ICD-10-CM

## 2020-10-20 LAB
ANION GAP SERPL CALC-SCNC: 11 MMO/L — SIGNIFICANT CHANGE UP (ref 7–14)
BUN SERPL-MCNC: 4 MG/DL — LOW (ref 7–23)
CALCIUM SERPL-MCNC: 9.1 MG/DL — SIGNIFICANT CHANGE UP (ref 8.4–10.5)
CHLORIDE SERPL-SCNC: 108 MMOL/L — HIGH (ref 98–107)
CO2 SERPL-SCNC: 24 MMOL/L — SIGNIFICANT CHANGE UP (ref 22–31)
CREAT SERPL-MCNC: 0.69 MG/DL — SIGNIFICANT CHANGE UP (ref 0.5–1.3)
GLUCOSE SERPL-MCNC: 79 MG/DL — SIGNIFICANT CHANGE UP (ref 70–99)
HCT VFR BLD CALC: 41.8 % — SIGNIFICANT CHANGE UP (ref 39–50)
HGB BLD-MCNC: 13.8 G/DL — SIGNIFICANT CHANGE UP (ref 13–17)
MAGNESIUM SERPL-MCNC: 1.5 MG/DL — LOW (ref 1.6–2.6)
MCHC RBC-ENTMCNC: 29 PG — SIGNIFICANT CHANGE UP (ref 27–34)
MCHC RBC-ENTMCNC: 33 % — SIGNIFICANT CHANGE UP (ref 32–36)
MCV RBC AUTO: 87.8 FL — SIGNIFICANT CHANGE UP (ref 80–100)
NRBC # FLD: 0 K/UL — SIGNIFICANT CHANGE UP (ref 0–0)
PHOSPHATE SERPL-MCNC: 2.9 MG/DL — SIGNIFICANT CHANGE UP (ref 2.5–4.5)
PLATELET # BLD AUTO: 176 K/UL — SIGNIFICANT CHANGE UP (ref 150–400)
PMV BLD: 10.1 FL — SIGNIFICANT CHANGE UP (ref 7–13)
POTASSIUM SERPL-MCNC: 3.2 MMOL/L — LOW (ref 3.5–5.3)
POTASSIUM SERPL-SCNC: 3.2 MMOL/L — LOW (ref 3.5–5.3)
RBC # BLD: 4.76 M/UL — SIGNIFICANT CHANGE UP (ref 4.2–5.8)
RBC # FLD: 13.5 % — SIGNIFICANT CHANGE UP (ref 10.3–14.5)
SODIUM SERPL-SCNC: 143 MMOL/L — SIGNIFICANT CHANGE UP (ref 135–145)
T4 FREE SERPL-MCNC: 1.11 NG/DL — SIGNIFICANT CHANGE UP (ref 0.9–1.8)
WBC # BLD: 7.29 K/UL — SIGNIFICANT CHANGE UP (ref 3.8–10.5)
WBC # FLD AUTO: 7.29 K/UL — SIGNIFICANT CHANGE UP (ref 3.8–10.5)

## 2020-10-20 PROCEDURE — 99239 HOSP IP/OBS DSCHRG MGMT >30: CPT

## 2020-10-20 PROCEDURE — 99233 SBSQ HOSP IP/OBS HIGH 50: CPT

## 2020-10-20 RX ORDER — AMLODIPINE BESYLATE 2.5 MG/1
10 TABLET ORAL DAILY
Refills: 0 | Status: DISCONTINUED | OUTPATIENT
Start: 2020-10-20 | End: 2020-10-20

## 2020-10-20 RX ORDER — MAGNESIUM SULFATE 500 MG/ML
1 VIAL (ML) INJECTION ONCE
Refills: 0 | Status: COMPLETED | OUTPATIENT
Start: 2020-10-20 | End: 2020-10-20

## 2020-10-20 RX ORDER — POTASSIUM CHLORIDE 20 MEQ
20 PACKET (EA) ORAL
Refills: 0 | Status: DISCONTINUED | OUTPATIENT
Start: 2020-10-20 | End: 2020-10-20

## 2020-10-20 RX ORDER — POTASSIUM CHLORIDE 20 MEQ
20 PACKET (EA) ORAL
Refills: 0 | Status: COMPLETED | OUTPATIENT
Start: 2020-10-20 | End: 2020-10-20

## 2020-10-20 RX ORDER — ZOLPIDEM TARTRATE 10 MG/1
1 TABLET ORAL
Qty: 0 | Refills: 0 | DISCHARGE

## 2020-10-20 RX ORDER — TRAZODONE HCL 50 MG
1 TABLET ORAL
Qty: 30 | Refills: 0
Start: 2020-10-20 | End: 2020-11-18

## 2020-10-20 RX ADMIN — Medication 20 MILLIEQUIVALENT(S): at 09:53

## 2020-10-20 RX ADMIN — Medication 2 GRAM(S): at 04:11

## 2020-10-20 RX ADMIN — Medication 2 MILLIGRAM(S): at 04:10

## 2020-10-20 RX ADMIN — Medication 1 MILLIGRAM(S): at 12:14

## 2020-10-20 RX ADMIN — Medication 100 MILLIGRAM(S): at 12:14

## 2020-10-20 RX ADMIN — Medication 1 PATCH: at 07:26

## 2020-10-20 RX ADMIN — MEMANTINE HYDROCHLORIDE 10 MILLIGRAM(S): 10 TABLET ORAL at 12:14

## 2020-10-20 RX ADMIN — AMLODIPINE BESYLATE 10 MILLIGRAM(S): 2.5 TABLET ORAL at 04:10

## 2020-10-20 RX ADMIN — Medication 1 PATCH: at 12:10

## 2020-10-20 RX ADMIN — Medication 1 TABLET(S): at 12:14

## 2020-10-20 RX ADMIN — Medication 100 GRAM(S): at 09:54

## 2020-10-20 RX ADMIN — Medication 20 MILLIEQUIVALENT(S): at 12:14

## 2020-10-20 NOTE — SBIRT NOTE ADULT - NSSBIRTBRIEFINTDET_GEN_A_CORE
Pt receptive to engagement in consult and agreeable to reviewing healthy drinking guidelines. This writer provided pt with [facility] Addiction [facility] at [facility]: [address], [address], [address] [place] [address] p: [phone], [facility] Crisis walk in clinic p:[phone], [facility] Treatment/Care Services for Substance Use List, and [facility] Tobacco [facility] Information: [address] [place] [address] , [place] [address] p: [phone], as pt endorses recent titration down from cigarette use and desire to fully refrain from use. This writer additionally reviewed "Rethink Drinking" booklet from [employer] - [place] Department of Health and Human Services.

## 2020-10-20 NOTE — PROGRESS NOTE BEHAVIORAL HEALTH - NSBHCHARTREVIEWIMAGING_PSY_A_CORE FT
EXAM:  MR BRAIN        PROCEDURE DATE:  Oct 19 2020         INTERPRETATION:  INDICATIONS:  Slurred speech with Korsakoff syndrome    TECHNIQUE:  Multiplanar imaging was performed using T1 weighted, T2 weighted and FLAIR sequences.  Diffusion weighted and susceptibility sensitive images were also obtained.    COMPARISON EXAMINATION:  10/18/2020 CT    FINDINGS: The study is limited by motion artifact  VENTRICLES AND SULCI:  Prominent in size compatible with mild cerebral volume loss  INTRA-AXIAL:  No mass, abnormal signal or evidence of acute cerebral ischemia. No abnormal signal or diffusion restriction is seen in the region of the third ventricle or aqueduct, given limitations of the exam.  EXTRA-AXIAL:  No mass or collection.  VISUALIZED SINUSES:  Normal.  VISUALIZED MASTOIDS:  Clear.  CALVARIUM:  Normal.  CAROTID FLOW VOIDS:  Present.  MISCELLANEOUS:  None.      IMPRESSION:  Motion limited exam.    No intracranial abnormality identified given study limitations.

## 2020-10-20 NOTE — PROGRESS NOTE ADULT - PROBLEM SELECTOR PLAN 8
- Continue home medications as per OMR  - BP currently stable. Continue to monitor q4h - c/w home medications as per OMR

## 2020-10-20 NOTE — DISCHARGE NOTE PROVIDER - HOSPITAL COURSE
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 Patient states he was having difficulty falling asleep and took one pill, 400 mg, of his  prescription medication from  and could not fall asleep. He then took another pill and still stated he was still having insomnia. Patient proceeded to take an additional two tablets and does not remember anything after until he woke up in [facility] ED. Patient states he has no interest in hurting himself or others and those ideas has "never crossed his mind." Patient states he has not had a drink in 2-3 weeks but according to chart review patient was seen at [place] ED 3 days ago for confusion, slurred speech, and patient missing for 26 hours. He states previously he was drinking 2L of wine daily. Per Wife, she states he has been buying alcohol recently. He states he has started to smoke again about 1 pack per day. He previously had stopped for the last 20 years but while at rehab decided to smoke again.     Intoxication by drug  - Accidental overdose on seroquel, no SI/HI, no depression  - EKG with QTc prolongation. QTc 477 on admission. Now Qtc 456  - symptoms resolved.  - Patient currently stable on RA. Monitor respiratory status.  - Patient wasn't prescribed seroquel for insomnia as outpatient and patient decided to try the seroquel for sleep. Per psych, recommends to try trazodone 100mg QHS for insomnia and follow up with psych outpatient.   - MRI head negative for CVA.    Non-traumatic rhabdomyolysis  - resolved with IVFs.  - Patient states that he fell asleep on the basement floor after taking the seroquel pills and his wife corroborates this, likely has mild rhabdo 2/2 sleeping on the floor for a prolonged period of time  - UA negative.     ETOH abuse  - Unknown time of last  drink.  - On symptom triggered CIWA  - Monitor for alcohol withdrawal sx's  - CIWA 0.     Fever, unspecified fever cause.  - not sure why a rectal temp was taken overnight as patient has no symptoms of cough, SOB, dyuria, or diarrhea. CXR negative. Patient has not had a recurrence. No need for further work up.     Hypokalemia.  - supplemented.     Slurred speech  - Patient's wife reports patient with sudden onset of slurred speech about 6 weeks ago, but currently on exam has no slurred speech and neurologic exam nonfocal.   - CTH here without acute findings, also had a CTH at [facility] on 10/15 that did not show any acute findings, did reveal some microvascular ischemic changes  - MRI head negative for CVA.    Thyroid dysfunction  - Slightly suppressed TSH  - T4 WNL so most likely sick euthyroid.  - Repeat TFTs in 4 weeks.     Vascular dementia.  - c/w home medications     HTN (hypertension)  - Continue home medications  - BP currently stable.     Patient is medically cleared for discharge on 10/20/2020 per Dr. Shipman.

## 2020-10-20 NOTE — SBIRT NOTE ADULT - NSSBIRTALCPOSREINDET_GEN_A_CORE
Pt AAOx3, euthymic mood and congruent affect, speech clear and concise, behavior appropriate to this writer.   Full screen positive. Brief Intervention Performed. Passive Referral To Treatment Attempted. Screening results were reviewed with the patient and patient was provided information about healthy guidelines and potential negative consequences associated with level of risk. Motivation and readiness to reduce or stop use was discussed and goals and activities to make changes were suggested/offered. Options discussed for further evaluation and treatment, but referral to treatment was not completed because: Patient refused.  Pt notes recent completion of inpt SA program at [facility] in [place]. Pt notes his spouse has connected him to [facility]. Pt declined need for this writer to confirm outpt SA appt.

## 2020-10-20 NOTE — PROGRESS NOTE ADULT - PROBLEM SELECTOR PLAN 5
- Patient's wife reports patient with sudden onset of slurred speech about 6 weeks ago, but currently on exam has no slurred speech and neurologic exam nonfocal.   - CTH here without acute findings, also had a CTH at [facility] on 10/15 that did not show any acute findings, did reveal some microvascular ischemic changes  - MRI head negative for CVA - supplemented

## 2020-10-20 NOTE — DISCHARGE NOTE PROVIDER - NSDCCPCAREPLAN_GEN_ALL_CORE_FT
PRINCIPAL DISCHARGE DIAGNOSIS  Diagnosis: Intoxication by drug  Assessment and Plan of Treatment: You had an accidental intoxication of seroquel. Your EKG on admission had prolonged QTC, which improved. Symptoms had resolved. You are stable on room air. You were seen by psych, recommends to try trazodone 100mg QHS for insomnia and follow up with psych outpatient. CT head and MRI head shows no stroke.      SECONDARY DISCHARGE DIAGNOSES  Diagnosis: Fever, unspecified fever cause  Assessment and Plan of Treatment: You had one episode of fever, which was taken rectally. You have no signs or symptoms of an infection. No further workup is needed at this time.    Diagnosis: ETOH abuse  Assessment and Plan of Treatment: You were monitored closely in the hospital. You have no had symptoms of withdrawal within past 24 hours. Please follow up with your PCP within one week of discharge.    Diagnosis: Slurred speech  Assessment and Plan of Treatment: Your CT head and MRI head negative for stroke. Please follow up with your PCP within one week of discharge. If symptoms reoccur, please return back to the emergency room as soon as possible.    Diagnosis: Vascular dementia without behavioral disturbance  Assessment and Plan of Treatment: Continue with current home medications. Please follow up with PCP and psychiatrist within one week of discharge.    Diagnosis: Thyroid dysfunction  Assessment and Plan of Treatment: You are to repeat blood work to check repeat TFTs in 4 weeks. Please follow up with PCP and make an appointment to get bloodwork done.    Diagnosis: HTN (hypertension)  Assessment and Plan of Treatment: You are to continue current home medications. Please follow up with PCP within one week of discharge.    Diagnosis: Rhabdomyolysis  Assessment and Plan of Treatment: You were treated with IV fluids and symptoms have improved. Please follow up with your PCP within one week of discharge.

## 2020-10-20 NOTE — PROGRESS NOTE ADULT - PROBLEM SELECTOR PLAN 6
- Slightly suppressed TSH  F/U  free T4 and T3 levels - Patient's wife reports patient with sudden onset of slurred speech about 6 weeks ago, but currently on exam has no slurred speech and neurologic exam nonfocal.   - CTH here without acute findings, also had a CTH at [facility] on 10/15 that did not show any acute findings, did reveal some microvascular ischemic changes  - MRI head negative for CVA

## 2020-10-20 NOTE — PROGRESS NOTE ADULT - PROBLEM SELECTOR PLAN 1
- Accidental overdose on seroquel, no SI/HI, no depression  -EKG with QTc prolongation. QTc 477 on admission. Now Qtc 456  -symptoms resolved.  - Patient currently stable on RA. Monitor respiratory status  - Monitor on tele  -Psychiatry consult appreciated  -MRI head negative for CVA - Accidental overdose on seroquel, no SI/HI, no depression  -EKG with QTc prolongation. QTc 477 on admission. Now Qtc 456  -symptoms resolved.  - Patient currently stable on RA. Monitor respiratory status  - Monitor on tele  -Psychiatry consult appreciated  -can resume Seroquel for insomnia; Patient educated that Seroquel should not be taken in that fashion again.   -MRI head negative for CVA - Accidental overdose on seroquel, no SI/HI, no depression  -EKG with QTc prolongation. QTc 477 on admission. Now Qtc 456  -symptoms resolved.  - Patient currently stable on RA. Monitor respiratory status  - Monitor on tele  -Psychiatry consult appreciated  -Spoke to wife Sarah who says he wasn't prescribed seroquel for insomnia and found   -MRI head negative for CVA - Accidental overdose on seroquel, no SI/HI, no depression  -EKG with QTc prolongation. QTc 477 on admission. Now Qtc 456  -symptoms resolved.  - Patient currently stable on RA. Monitor respiratory status  -Spoke to wife Sarah who says he wasn't prescribed seroquel for insomnia as outpatient and patient decided to try the seroquel for sleep. So patient needsa appropriate and safe medication for sleep in setting of korsakoff syndrome. Will ask psychiatry for guidance.   -MRI head negative for CVA

## 2020-10-20 NOTE — PROGRESS NOTE ADULT - PROBLEM SELECTOR PLAN 4
- supplemented not sure why a rectal temp was taken overnight as patient has no symptoms of cough, SOB, dyuria, or diarrhea. CXR negative. Patient has not had a recurrence. No need for further work up.

## 2020-10-20 NOTE — DISCHARGE NOTE PROVIDER - NSDCMRMEDTOKEN_GEN_ALL_CORE_FT
amLODIPine 10 mg oral tablet: 1 tab(s) orally once a day  donepezil 10 mg oral tablet: 1 tab(s) orally once a day (at bedtime)  Lovaza 1000 mg oral capsule: 2 cap(s) orally 2 times a day (Last filled June/2020)  memantine 10 mg oral tablet: 1 tab(s) orally once a day  zolpidem 5 mg oral tablet: 1 tab(s) orally once a day (at bedtime), As Needed   amLODIPine 10 mg oral tablet: 1 tab(s) orally once a day  donepezil 10 mg oral tablet: 1 tab(s) orally once a day (at bedtime)  Lovaza 1000 mg oral capsule: 2 cap(s) orally 2 times a day (Last filled June/2020)  memantine 10 mg oral tablet: 1 tab(s) orally once a day  traZODone 100 mg oral tablet: 1 tab(s) orally once a day (at bedtime)

## 2020-10-20 NOTE — PROGRESS NOTE ADULT - PROBLEM SELECTOR PLAN 9
1.  Name of PCP: Unknown  2.  PCP Contacted on Admission: [ ] Y    [x] N - admitted at night    3.  PCP contacted at Discharge: [ ] Y    [ ] N    [ ] N/A  4.  Post-Discharge Appointment Date and Location:  5.  Summary of Handoff given to PCP: - Continue home medications as per OMR  - BP currently stable. Continue to monitor q4h

## 2020-10-20 NOTE — PROGRESS NOTE ADULT - ASSESSMENT
65 y.o. male w/ Hx Etoh abuse c/b Korsakoff syndrome, HTN, vascular dementia p/w accidental drug overdose with Seroquel due to insomnia. D/C 40 minutes.

## 2020-10-20 NOTE — PROGRESS NOTE ADULT - SUBJECTIVE AND OBJECTIVE BOX
Patient is a 65y old  Male who presents with a chief complaint of Overdose (20 Oct 2020 11:28)      SUBJECTIVE / OVERNIGHT EVENTS:    MEDICATIONS  (STANDING):  amLODIPine   Tablet 10 milliGRAM(s) Oral daily  donepezil 10 milliGRAM(s) Oral at bedtime  folic acid 1 milliGRAM(s) Oral daily  influenza   Vaccine 0.5 milliLiter(s) IntraMuscular once  memantine 10 milliGRAM(s) Oral daily  multivitamin 1 Tablet(s) Oral daily  nicotine - 21 mG/24Hr(s) Patch 1 patch Transdermal daily  omega-3-Acid Ethyl Esters 2 Gram(s) Oral two times a day  potassium chloride    Tablet ER 20 milliEquivalent(s) Oral every 2 hours  sodium chloride 0.9% 1000 milliLiter(s) (150 mL/Hr) IV Continuous <Continuous>  thiamine 100 milliGRAM(s) Oral daily    MEDICATIONS  (PRN):  haloperidol     Tablet 5 milliGRAM(s) Oral every 6 hours PRN agitation  LORazepam     Tablet 2 milliGRAM(s) Oral every 2 hours PRN Symptom-triggered 2 point increase in CIWA-Ar  LORazepam     Tablet 2 milliGRAM(s) Oral every 1 hour PRN Symptom-triggered: each CIWA -Ar score 8 or GREATER  melatonin 3 milliGRAM(s) Oral at bedtime PRN Insomnia      Vital Signs Last 24 Hrs  T(C): 36.4 (20 Oct 2020 08:00), Max: 38.8 (19 Oct 2020 20:40)  T(F): 97.5 (20 Oct 2020 08:00), Max: 101.9 (19 Oct 2020 20:40)  HR: 84 (20 Oct 2020 08:00) (78 - 105)  BP: 148/85 (20 Oct 2020 08:00) (148/85 - 178/93)  BP(mean): --  RR: 18 (20 Oct 2020 08:00) (18 - 18)  SpO2: 99% (20 Oct 2020 08:00) (97% - 100%)  CAPILLARY BLOOD GLUCOSE        I&O's Summary      PHYSICAL EXAM:  GENERAL: NAD, well-developed  HEAD:  Atraumatic, Normocephalic  EYES: EOMI, PERRLA, conjunctiva and sclera clear  NECK: Supple, No JVD  CHEST/LUNG: Clear to auscultation bilaterally; No wheeze  HEART: Regular rate and rhythm; No murmurs, rubs, or gallops  ABDOMEN: Soft, Nontender, Nondistended; Bowel sounds present  EXTREMITIES:  2+ Peripheral Pulses, No clubbing, cyanosis, or edema  PSYCH: AAOx3  NEUROLOGY: non-focal  SKIN: No rashes or lesions    LABS:                        13.8   7.29  )-----------( 176      ( 20 Oct 2020 06:00 )             41.8     10-20    143  |  108<H>  |  4<L>  ----------------------------<  79  3.2<L>   |  24  |  0.69    Ca    9.1      20 Oct 2020 06:00  Phos  2.9     10-20  Mg     1.5     10-20    TPro  6.2  /  Alb  3.9  /  TBili  0.6  /  DBili  x   /  AST  35  /  ALT  23  /  AlkPhos  69  10-19    PT/INR - ( 19 Oct 2020 22:40 )   PT: 12.7 SEC;   INR: 1.11          PTT - ( 19 Oct 2020 22:40 )  PTT:31.5 SEC  CARDIAC MARKERS ( 19 Oct 2020 22:40 )  x     / x     / 246 u/L / x     / x      CARDIAC MARKERS ( 19 Oct 2020 10:12 )  x     / x     / 377 u/L / 4.48 ng/mL / x      CARDIAC MARKERS ( 18 Oct 2020 22:10 )  x     / x     / 482 u/L / x     / x      CARDIAC MARKERS ( 18 Oct 2020 15:07 )  x     / x     / 847 u/L / x     / x          Urinalysis Basic - ( 19 Oct 2020 22:30 )    Color: YELLOW / Appearance: CLEAR / S.011 / pH: 6.5  Gluc: NEGATIVE / Ketone: NEGATIVE  / Bili: NEGATIVE / Urobili: NORMAL   Blood: NEGATIVE / Protein: 10 / Nitrite: NEGATIVE   Leuk Esterase: NEGATIVE / RBC: x / WBC x   Sq Epi: x / Non Sq Epi: x / Bacteria: x        RADIOLOGY & ADDITIONAL TESTS:    Imaging Personally Reviewed:    Consultant(s) Notes Reviewed:      Care Discussed with Consultants/Other Providers:   Patient is a 65y old  Male who presents with a chief complaint of Overdose (20 Oct 2020 11:28)      SUBJECTIVE / OVERNIGHT EVENTS:  Patient has no new complaints. Denies cp, SOB, abdominal pain, N/V/D     MEDICATIONS  (STANDING):  amLODIPine   Tablet 10 milliGRAM(s) Oral daily  donepezil 10 milliGRAM(s) Oral at bedtime  folic acid 1 milliGRAM(s) Oral daily  influenza   Vaccine 0.5 milliLiter(s) IntraMuscular once  memantine 10 milliGRAM(s) Oral daily  multivitamin 1 Tablet(s) Oral daily  nicotine - 21 mG/24Hr(s) Patch 1 patch Transdermal daily  omega-3-Acid Ethyl Esters 2 Gram(s) Oral two times a day  potassium chloride    Tablet ER 20 milliEquivalent(s) Oral every 2 hours  sodium chloride 0.9% 1000 milliLiter(s) (150 mL/Hr) IV Continuous <Continuous>  thiamine 100 milliGRAM(s) Oral daily    MEDICATIONS  (PRN):  haloperidol     Tablet 5 milliGRAM(s) Oral every 6 hours PRN agitation  LORazepam     Tablet 2 milliGRAM(s) Oral every 2 hours PRN Symptom-triggered 2 point increase in CIWA-Ar  LORazepam     Tablet 2 milliGRAM(s) Oral every 1 hour PRN Symptom-triggered: each CIWA -Ar score 8 or GREATER  melatonin 3 milliGRAM(s) Oral at bedtime PRN Insomnia      Vital Signs Last 24 Hrs  T(C): 36.4 (20 Oct 2020 08:00), Max: 38.8 (19 Oct 2020 20:40)  T(F): 97.5 (20 Oct 2020 08:00), Max: 101.9 (19 Oct 2020 20:40)  HR: 84 (20 Oct 2020 08:00) (78 - 105)  BP: 148/85 (20 Oct 2020 08:00) (148/85 - 178/93)  BP(mean): --  RR: 18 (20 Oct 2020 08:00) (18 - 18)  SpO2: 99% (20 Oct 2020 08:00) (97% - 100%)  CAPILLARY BLOOD GLUCOSE        I&O's Summary      PHYSICAL EXAM:  GENERAL: NAD, well-developed  HEAD:  Atraumatic, Normocephalic  EYES: EOMI, PERRLA, conjunctiva and sclera clear  NECK: Supple, No JVD  CHEST/LUNG: Clear to auscultation bilaterally; No wheeze  HEART: Regular rate and rhythm; No murmurs, rubs, or gallops  ABDOMEN: Soft, Nontender, Nondistended; Bowel sounds present  EXTREMITIES:  2+ Peripheral Pulses, No clubbing, cyanosis, or edema  PSYCH: AAOx3  NEUROLOGY: non-focal  SKIN: No rashes or lesions    LABS:                        13.8   7.29  )-----------( 176      ( 20 Oct 2020 06:00 )             41.8     10-20    143  |  108<H>  |  4<L>  ----------------------------<  79  3.2<L>   |  24  |  0.69    Ca    9.1      20 Oct 2020 06:00  Phos  2.9     10-20  Mg     1.5     10-20    TPro  6.2  /  Alb  3.9  /  TBili  0.6  /  DBili  x   /  AST  35  /  ALT  23  /  AlkPhos  69  10-19    PT/INR - ( 19 Oct 2020 22:40 )   PT: 12.7 SEC;   INR: 1.11          PTT - ( 19 Oct 2020 22:40 )  PTT:31.5 SEC  CARDIAC MARKERS ( 19 Oct 2020 22:40 )  x     / x     / 246 u/L / x     / x      CARDIAC MARKERS ( 19 Oct 2020 10:12 )  x     / x     / 377 u/L / 4.48 ng/mL / x      CARDIAC MARKERS ( 18 Oct 2020 22:10 )  x     / x     / 482 u/L / x     / x      CARDIAC MARKERS ( 18 Oct 2020 15:07 )  x     / x     / 847 u/L / x     / x          Urinalysis Basic - ( 19 Oct 2020 22:30 )    Color: YELLOW / Appearance: CLEAR / S.011 / pH: 6.5  Gluc: NEGATIVE / Ketone: NEGATIVE  / Bili: NEGATIVE / Urobili: NORMAL   Blood: NEGATIVE / Protein: 10 / Nitrite: NEGATIVE   Leuk Esterase: NEGATIVE / RBC: x / WBC x   Sq Epi: x / Non Sq Epi: x / Bacteria: x        RADIOLOGY & ADDITIONAL TESTS:    Imaging Personally Reviewed:    Consultant(s) Notes Reviewed:      Care Discussed with Consultants/Other Providers:

## 2020-10-20 NOTE — PROGRESS NOTE ADULT - PROBLEM SELECTOR PLAN 2
- resolved with IVFs.  - Patient states that he fell asleep on the basement floor after taking the seroquel pills and his wife corroborates this, likely has mild rhabdo 2/2 sleeping on the floor for a prolonged period of time  - UA negative

## 2020-10-20 NOTE — PROGRESS NOTE BEHAVIORAL HEALTH - NSBHCHARTREVIEWVS_PSY_A_CORE FT
Vital Signs Last 24 Hrs  T(C): 36.4 (20 Oct 2020 08:00), Max: 38.8 (19 Oct 2020 20:40)  T(F): 97.5 (20 Oct 2020 08:00), Max: 101.9 (19 Oct 2020 20:40)  HR: 84 (20 Oct 2020 08:00) (78 - 105)  BP: 148/85 (20 Oct 2020 08:00) (148/85 - 178/93)  BP(mean): --  RR: 18 (20 Oct 2020 08:00) (18 - 18)  SpO2: 99% (20 Oct 2020 08:00) (97% - 100%)

## 2020-10-20 NOTE — PROGRESS NOTE BEHAVIORAL HEALTH - NSBHCONSULTMEDS_PSY_A_CORE FT
none at this time none at this time; can consider Trazodone 100mg HS for sleep and can provide Rx for this but patient will need to attend outpatient appointment which he is currently ambivalent about

## 2020-10-20 NOTE — PROGRESS NOTE BEHAVIORAL HEALTH - SUMMARY
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     Pt more cooperative on exam today, but adamant that he wants to be discharge claiming he needs to go back to work. Pt w/ episode of fever o/n, but denies any psychomotor retardation, and notes he feels well. Pt with improved recall (3/3) compared to yesterday, but still will poor attention. Denies any manic or psychotic symptoms and notes he is sleeping better. Denies any current SIIP/HIIP. Pt interested in f/u with a new outpt psychiatrist for sleep management. At this point, no recommendation for inpatient admission and family feels safe with pt returning home.

## 2020-10-20 NOTE — PROGRESS NOTE BEHAVIORAL HEALTH - NSBHCHARTREVIEWLAB_PSY_A_CORE FT
13.8   7.29  )-----------( 176      ( 20 Oct 2020 06:00 )             41.8     10-20    143  |  108<H>  |  4<L>  ----------------------------<  79  3.2<L>   |  24  |  0.69    Ca    9.1      20 Oct 2020 06:00  Phos  2.9     10-20  Mg     1.5     10-20    TPro  6.2  /  Alb  3.9  /  TBili  0.6  /  DBili  x   /  AST  35  /  ALT  23  /  AlkPhos  69  10-19    Urinalysis Basic - ( 19 Oct 2020 22:30 )    Color: YELLOW / Appearance: CLEAR / S.011 / pH: 6.5  Gluc: NEGATIVE / Ketone: NEGATIVE  / Bili: NEGATIVE / Urobili: NORMAL   Blood: NEGATIVE / Protein: 10 / Nitrite: NEGATIVE   Leuk Esterase: NEGATIVE / RBC: x / WBC x   Sq Epi: x / Non Sq Epi: x / Bacteria: x

## 2020-10-20 NOTE — PROGRESS NOTE ADULT - PROBLEM SELECTOR PLAN 3
- Unknown time of last  drink.  - On symptom triggered CIWA  - Monitor for alcohol withdrawal sx's  -CIWA 0

## 2020-10-20 NOTE — PROGRESS NOTE BEHAVIORAL HEALTH - CASE SUMMARY
Chart reviewed. Pt seen and he is improved, less pressured, calmer, AA O x 3. However, he continues to minimize his symptoms or the recent event of over-using his Seroquel. That being said, both he and his wife are aligned with him returning home, and the medical team has cleared him. He is psychiatrically cleared for discharge with outpatient follow-up on a voluntary basis. No psychiatric contraindications to discharge when cleared. Trazodone recommended as above. Signing off but please call if questions/concerns arise.

## 2020-10-20 NOTE — PROGRESS NOTE BEHAVIORAL HEALTH - RISK ASSESSMENT
The patient has a low acute risk for suicide. Risk factors include: mood disorder, chronic eoth use d/o, dementia, social stressors, male gender, age, and access to firearms.  Protective factors include future orientation, identifies reasons for living, responsibility to self and family, social support, no  previous hx of psychiatric admissions, and no previous hx of SA.

## 2020-10-20 NOTE — PROGRESS NOTE BEHAVIORAL HEALTH - NSBHFUPINTERVALHXFT_PSY_A_CORE
Pt examined at bedside. Reports he slept well. Denies any stiffness or any other psychomotor retardation/agitation. Continuously states he wants to go home. Notes he feels better s/p OD on Seroquel. Given hx of insomnia, notes seroquel has been the most useful med to help him sleep, but acknowledges he took too much. Denies any psychotic/manic symptoms. Insists that he needs to go home as he has to get back to work. No longer interested in seeing Dr. Gandhi, but would be open to f/u with another outpt psychiatrist for monitoring of sleep issues.

## 2020-10-20 NOTE — PROGRESS NOTE ADULT - PROBLEM SELECTOR PLAN 7
- c/w home medications as per OMR - Slightly suppressed TSH  - T4 WNL so most likely sick euthyroid.  Repeat TFTs in 4 weeks.

## 2020-10-20 NOTE — DISCHARGE NOTE NURSING/CASE MANAGEMENT/SOCIAL WORK - PATIENT PORTAL LINK FT
You can access the FollowMyHealth Patient Portal offered by [facility] by registering at the following website: [email or web address]. By joining [employer]’s FollowMyHealth portal, you will also be able to view your health information using other applications (apps) compatible with our system.

## 2020-10-25 LAB
CULTURE RESULTS: SIGNIFICANT CHANGE UP
CULTURE RESULTS: SIGNIFICANT CHANGE UP
SPECIMEN SOURCE: SIGNIFICANT CHANGE UP
SPECIMEN SOURCE: SIGNIFICANT CHANGE UP

## 2022-04-25 NOTE — BEHAVIORAL HEALTH ASSESSMENT NOTE - NSBHCONSULTMEDSEVERE_PSY_A_CORE FT
-haldol 5mg PO/IM q6h for agitation/anxiety Finasteride Counseling:  I discussed with the patient the risks of use of finasteride including but not limited to decreased libido, decreased ejaculate volume, gynecomastia, and depression. Women should not handle medication.  All of the patient's questions and concerns were addressed. Finasteride Male Counseling: Finasteride Counseling:  I discussed with the patient the risks of use of finasteride including but not limited to decreased libido, decreased ejaculate volume, gynecomastia, and depression. Women should not handle medication.  All of the patient's questions and concerns were addressed.

## 2022-05-23 NOTE — ED ADULT NURSE NOTE - CHPI ED NUR DURATION
[No Acute Distress] : no acute distress [Well Nourished] : well nourished [Well Developed] : well developed [Well-Appearing] : well-appearing [Normal Sclera/Conjunctiva] : normal sclera/conjunctiva [PERRL] : pupils equal round and reactive to light [EOMI] : extraocular movements intact [Fundoscopic Exam Performed] : fundoscopic ~T exam ~C was performed [Normal Outer Ear/Nose] : the outer ears and nose were normal in appearance [No JVD] : no jugular venous distention [No Lymphadenopathy] : no lymphadenopathy [Supple] : supple [No Respiratory Distress] : no respiratory distress  [No Accessory Muscle Use] : no accessory muscle use [Clear to Auscultation] : lungs were clear to auscultation bilaterally [Normal Rate] : normal rate  [Regular Rhythm] : with a regular rhythm [Normal S1, S2] : normal S1 and S2 [No Murmur] : no murmur heard [Pedal Pulses Present] : the pedal pulses are present [No Edema] : there was no peripheral edema [Soft] : abdomen soft [Non Tender] : non-tender [Non-distended] : non-distended [Normal Posterior Cervical Nodes] : no posterior cervical lymphadenopathy [Normal Anterior Cervical Nodes] : no anterior cervical lymphadenopathy [No CVA Tenderness] : no CVA  tenderness [No Spinal Tenderness] : no spinal tenderness [No Joint Swelling] : no joint swelling [Grossly Normal Strength/Tone] : grossly normal strength/tone [No Rash] : no rash [Coordination Grossly Intact] : coordination grossly intact [No Focal Deficits] : no focal deficits [Normal Gait] : normal gait [Deep Tendon Reflexes (DTR)] : deep tendon reflexes were 2+ and symmetric [Normal Affect] : the affect was normal [Normal Insight/Judgement] : insight and judgment were intact 26/hour(s)

## 2022-05-31 NOTE — DISCHARGE NOTE PROVIDER - NS AS DC PROVIDER CONTACT Y/N MULTI
Yes Adbry Counseling: I discussed with the patient the risks of tralokinumab including but not limited to eye infection and irritation, cold sores, injection site reactions, worsening of asthma, allergic reactions and increased risk of parasitic infection.  Live vaccines should be avoided while taking tralokinumab. The patient understands that monitoring is required and they must alert us or the primary physician if symptoms of infection or other concerning signs are noted.

## 2023-04-19 NOTE — BEHAVIORAL HEALTH ASSESSMENT NOTE - NSBHREFERDETAILS_PSY_A_CORE_FT
etoh w/d, OD Advancement-Rotation Flap Text: The defect edges were debeveled with a #15 scalpel blade.  Given the location of the defect, shape of the defect and the proximity to free margins an advancement-rotation flap was deemed most appropriate.  Using a sterile surgical marker, an appropriate flap was drawn incorporating the defect and placing the expected incisions within the relaxed skin tension lines where possible. The area thus outlined was incised deep to adipose tissue with a #15 scalpel blade.  The skin margins were undermined to an appropriate distance in all directions utilizing iris scissors.

## 2023-08-28 NOTE — PHYSICAL THERAPY INITIAL EVALUATION ADULT - LEVEL OF INDEPENDENCE: SIT/STAND, REHAB EVAL
well developed, well nourished , in no acute distress , ambulating without difficulty , normal communication ability
moderate assist (50% patients effort)

## 2024-07-01 NOTE — ED ADULT NURSE NOTE - CAS DISCH ACCOMP BY
Daughter notified that they should contact Medicare to see what all they need filled out or submitted for their reimbursement. No further questions remain.  
Patient's daughter called stating an order is going to be sent in for a walker for the patient. Jannette is asking if there is a form that will need to be filled out for Medicare. Please call Jannette to discuss.  
Self

## 2024-09-19 NOTE — ED PROVIDER NOTE - TOBACCO USE
----- Message from Pavithra Best sent at 9/19/2024  9:37 AM CDT -----  Regarding: lab results  Contact: Pt  Patient requesting Lab Results be Faxed to Dr Barrington Harmon and  Dr Theo Hayes. Patient  Didn't Provide a Fax Number     Please advise    Pt  Phone -  [phone]    Thank You   Never smoker

## 2025-04-02 NOTE — ED PROVIDER NOTE - ALLERGIC/IMMUNOLOGIC NEGATIVE STATEMENT, MLM
1st call message left to schedule Colonoscopy  
2nd call message left to schedule Colonoscopy.  
Final attempt to contact patient to schedule Colonoscopy.  Message left for patient to call back    Final letter sent  
June recall/ orestes/ lizeth    Recall Information   ID: [id number] Status: New [10]   Patient: Deny Colón Visit Type: PROCEDURE-OFF PREMISE [826]   Notification Date: 6/2/2025 Recall Date: 6/2/2025   Expiration Date:   Letter:     Audit Trail: User: Time: Status:    Radha Montelongo RN [[id number]] 6/2/2022 12:14 PM New [10]   Scheduling Instructions   hyperplastic and serrated adenoma repeat in 3 years     
Left message to call back office.    
no dermatitis, no environmental allergies, no food allergies, no immunosuppressive disorder, and no pruritus.
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
